# Patient Record
Sex: FEMALE | Race: WHITE | NOT HISPANIC OR LATINO | Employment: OTHER | URBAN - METROPOLITAN AREA
[De-identification: names, ages, dates, MRNs, and addresses within clinical notes are randomized per-mention and may not be internally consistent; named-entity substitution may affect disease eponyms.]

---

## 2023-04-07 ENCOUNTER — TELEPHONE (OUTPATIENT)
Dept: PRIMARY CARE | Facility: CLINIC | Age: 88
End: 2023-04-07
Payer: MEDICARE

## 2023-04-07 DIAGNOSIS — Z00.00 ROUTINE HEALTH MAINTENANCE: ICD-10-CM

## 2023-04-21 ENCOUNTER — LAB (OUTPATIENT)
Dept: LAB | Facility: LAB | Age: 88
End: 2023-04-21
Payer: MEDICARE

## 2023-04-21 DIAGNOSIS — Z00.00 ROUTINE HEALTH MAINTENANCE: ICD-10-CM

## 2023-04-21 LAB
ALANINE AMINOTRANSFERASE (SGPT) (U/L) IN SER/PLAS: 8 U/L (ref 7–45)
ALBUMIN (G/DL) IN SER/PLAS: 3.9 G/DL (ref 3.4–5)
ALKALINE PHOSPHATASE (U/L) IN SER/PLAS: 62 U/L (ref 33–136)
ANION GAP IN SER/PLAS: 10 MMOL/L (ref 10–20)
ASPARTATE AMINOTRANSFERASE (SGOT) (U/L) IN SER/PLAS: 11 U/L (ref 9–39)
BACTERIA, URINE: ABNORMAL /HPF
BASOPHILS (10*3/UL) IN BLOOD BY AUTOMATED COUNT: 0.02 X10E9/L (ref 0–0.1)
BASOPHILS/100 LEUKOCYTES IN BLOOD BY AUTOMATED COUNT: 0.4 % (ref 0–2)
BILIRUBIN TOTAL (MG/DL) IN SER/PLAS: 1.3 MG/DL (ref 0–1.2)
C REACTIVE PROTEIN (MG/L) IN SER/PLAS: <0.1 MG/DL
CALCIDIOL (25 OH VITAMIN D3) (NG/ML) IN SER/PLAS: 70 NG/ML
CALCIUM (MG/DL) IN SER/PLAS: 11.1 MG/DL (ref 8.6–10.6)
CARBON DIOXIDE, TOTAL (MMOL/L) IN SER/PLAS: 35 MMOL/L (ref 21–32)
CHLORIDE (MMOL/L) IN SER/PLAS: 101 MMOL/L (ref 98–107)
CHOLESTEROL (MG/DL) IN SER/PLAS: 176 MG/DL (ref 0–199)
CHOLESTEROL IN HDL (MG/DL) IN SER/PLAS: 45.1 MG/DL
CHOLESTEROL/HDL RATIO: 3.9
CREATININE (MG/DL) IN SER/PLAS: 0.97 MG/DL (ref 0.5–1.05)
DIGOXIN (NG/ML) IN SER/PLAS: 1.28 NG/ML (ref 0.8–2)
EOSINOPHILS (10*3/UL) IN BLOOD BY AUTOMATED COUNT: 0.04 X10E9/L (ref 0–0.4)
EOSINOPHILS/100 LEUKOCYTES IN BLOOD BY AUTOMATED COUNT: 0.7 % (ref 0–6)
ERYTHROCYTE DISTRIBUTION WIDTH (RATIO) BY AUTOMATED COUNT: 12.6 % (ref 11.5–14.5)
ERYTHROCYTE MEAN CORPUSCULAR HEMOGLOBIN CONCENTRATION (G/DL) BY AUTOMATED: 31.6 G/DL (ref 32–36)
ERYTHROCYTE MEAN CORPUSCULAR VOLUME (FL) BY AUTOMATED COUNT: 104 FL (ref 80–100)
ERYTHROCYTES (10*6/UL) IN BLOOD BY AUTOMATED COUNT: 3.65 X10E12/L (ref 4–5.2)
ESTIMATED AVERAGE GLUCOSE FOR HBA1C: 128 MG/DL
GFR FEMALE: 57 ML/MIN/1.73M2
GLUCOSE (MG/DL) IN SER/PLAS: 112 MG/DL (ref 74–99)
HEMATOCRIT (%) IN BLOOD BY AUTOMATED COUNT: 38 % (ref 36–46)
HEMOGLOBIN (G/DL) IN BLOOD: 12 G/DL (ref 12–16)
HEMOGLOBIN A1C/HEMOGLOBIN TOTAL IN BLOOD: 6.1 %
IMMATURE GRANULOCYTES/100 LEUKOCYTES IN BLOOD BY AUTOMATED COUNT: 0.4 % (ref 0–0.9)
LDL: 92 MG/DL (ref 0–99)
LEUKOCYTES (10*3/UL) IN BLOOD BY AUTOMATED COUNT: 5.5 X10E9/L (ref 4.4–11.3)
LYMPHOCYTES (10*3/UL) IN BLOOD BY AUTOMATED COUNT: 0.6 X10E9/L (ref 0.8–3)
LYMPHOCYTES/100 LEUKOCYTES IN BLOOD BY AUTOMATED COUNT: 10.8 % (ref 13–44)
MONOCYTES (10*3/UL) IN BLOOD BY AUTOMATED COUNT: 0.57 X10E9/L (ref 0.05–0.8)
MONOCYTES/100 LEUKOCYTES IN BLOOD BY AUTOMATED COUNT: 10.3 % (ref 2–10)
MUCUS, URINE: ABNORMAL /LPF
NEUTROPHILS (10*3/UL) IN BLOOD BY AUTOMATED COUNT: 4.28 X10E9/L (ref 1.6–5.5)
NEUTROPHILS/100 LEUKOCYTES IN BLOOD BY AUTOMATED COUNT: 77.4 % (ref 40–80)
NRBC (PER 100 WBCS) BY AUTOMATED COUNT: 0 /100 WBC (ref 0–0)
PLATELETS (10*3/UL) IN BLOOD AUTOMATED COUNT: 179 X10E9/L (ref 150–450)
POTASSIUM (MMOL/L) IN SER/PLAS: 4.8 MMOL/L (ref 3.5–5.3)
PROTEIN TOTAL: 5.9 G/DL (ref 6.4–8.2)
RBC, URINE: 1 /HPF (ref 0–5)
SODIUM (MMOL/L) IN SER/PLAS: 141 MMOL/L (ref 136–145)
SQUAMOUS EPITHELIAL CELLS, URINE: 1 /HPF
THYROTROPIN (MIU/L) IN SER/PLAS BY DETECTION LIMIT <= 0.05 MIU/L: 0.68 MIU/L (ref 0.44–3.98)
TRIGLYCERIDE (MG/DL) IN SER/PLAS: 195 MG/DL (ref 0–149)
UREA NITROGEN (MG/DL) IN SER/PLAS: 31 MG/DL (ref 6–23)
VLDL: 39 MG/DL (ref 0–40)
WBC CLUMPS, URINE: ABNORMAL /HPF
WBC, URINE: >182 /HPF (ref 0–5)

## 2023-04-21 PROCEDURE — 84443 ASSAY THYROID STIM HORMONE: CPT

## 2023-04-21 PROCEDURE — 80053 COMPREHEN METABOLIC PANEL: CPT

## 2023-04-21 PROCEDURE — 36415 COLL VENOUS BLD VENIPUNCTURE: CPT

## 2023-04-21 PROCEDURE — 86140 C-REACTIVE PROTEIN: CPT

## 2023-04-21 PROCEDURE — 80061 LIPID PANEL: CPT

## 2023-04-21 PROCEDURE — 82306 VITAMIN D 25 HYDROXY: CPT

## 2023-04-21 PROCEDURE — 85025 COMPLETE CBC W/AUTO DIFF WBC: CPT

## 2023-04-21 PROCEDURE — 81001 URINALYSIS AUTO W/SCOPE: CPT

## 2023-04-21 PROCEDURE — 83036 HEMOGLOBIN GLYCOSYLATED A1C: CPT

## 2023-04-25 PROBLEM — I50.30 HEART FAILURE WITH PRESERVED LEFT VENTRICULAR FUNCTION (HFPEF) (MULTI): Status: ACTIVE | Noted: 2023-04-25

## 2023-04-25 PROBLEM — J44.9 CHRONIC OBSTRUCTIVE PULMONARY DISEASE (MULTI): Status: ACTIVE | Noted: 2022-10-05

## 2023-04-25 PROBLEM — M48.062 NEUROGENIC CLAUDICATION DUE TO LUMBAR SPINAL STENOSIS: Status: ACTIVE | Noted: 2023-04-25

## 2023-04-25 PROBLEM — R53.83 FATIGUE: Status: ACTIVE | Noted: 2023-04-25

## 2023-04-25 PROBLEM — F32.A DEPRESSION: Status: ACTIVE | Noted: 2018-06-12

## 2023-04-25 PROBLEM — Z86.79 H/O: RHEUMATIC FEVER: Status: ACTIVE | Noted: 2023-04-25

## 2023-04-25 PROBLEM — K58.0 IRRITABLE BOWEL SYNDROME WITH DIARRHEA: Status: ACTIVE | Noted: 2022-10-05

## 2023-04-25 PROBLEM — Z95.818 PRESENCE OF WATCHMAN LEFT ATRIAL APPENDAGE CLOSURE DEVICE: Status: ACTIVE | Noted: 2023-04-25

## 2023-04-25 PROBLEM — R35.0 URINARY FREQUENCY: Status: ACTIVE | Noted: 2018-12-25

## 2023-04-25 PROBLEM — M54.16 LUMBAR RADICULAR PAIN: Status: ACTIVE | Noted: 2023-04-25

## 2023-04-25 PROBLEM — M54.16 SPINAL STENOSIS OF LUMBAR REGION WITH RADICULOPATHY: Status: ACTIVE | Noted: 2017-04-11

## 2023-04-25 PROBLEM — R60.9 EDEMA: Status: ACTIVE | Noted: 2023-04-25

## 2023-04-25 PROBLEM — G25.81 RESTLESS LEGS SYNDROME: Status: ACTIVE | Noted: 2023-04-25

## 2023-04-25 PROBLEM — G62.9 PERIPHERAL NEUROPATHY: Status: ACTIVE | Noted: 2023-04-25

## 2023-04-25 PROBLEM — G20.A1 PARKINSON'S DISEASE (MULTI): Status: ACTIVE | Noted: 2018-12-25

## 2023-04-25 PROBLEM — M17.0 DEGENERATIVE ARTHRITIS OF KNEE, BILATERAL: Status: ACTIVE | Noted: 2023-04-25

## 2023-04-25 PROBLEM — E78.5 HLD (HYPERLIPIDEMIA): Status: ACTIVE | Noted: 2022-10-05

## 2023-04-25 PROBLEM — M54.50 LOW BACK PAIN: Status: ACTIVE | Noted: 2023-04-25

## 2023-04-25 PROBLEM — K21.9 GASTROESOPHAGEAL REFLUX DISEASE: Status: ACTIVE | Noted: 2023-04-25

## 2023-04-25 PROBLEM — N18.30 CKD (CHRONIC KIDNEY DISEASE), STAGE III (MULTI): Status: ACTIVE | Noted: 2023-04-25

## 2023-04-25 PROBLEM — G31.84 MCI (MILD COGNITIVE IMPAIRMENT): Status: ACTIVE | Noted: 2023-04-25

## 2023-04-25 PROBLEM — K63.8219 SMALL INTESTINAL BACTERIAL OVERGROWTH: Status: ACTIVE | Noted: 2023-04-25

## 2023-04-25 PROBLEM — K52.9 CHRONIC DIARRHEA: Status: ACTIVE | Noted: 2023-04-25

## 2023-04-25 PROBLEM — G47.00 INSOMNIA: Status: ACTIVE | Noted: 2018-06-12

## 2023-04-25 PROBLEM — F41.9 ANXIETY: Status: ACTIVE | Noted: 2018-06-12

## 2023-04-25 PROBLEM — R39.15 URINARY URGENCY: Status: ACTIVE | Noted: 2023-04-25

## 2023-04-25 PROBLEM — R73.01 FASTING HYPERGLYCEMIA: Status: ACTIVE | Noted: 2023-04-25

## 2023-04-25 PROBLEM — R26.81 GAIT INSTABILITY: Status: ACTIVE | Noted: 2021-07-14

## 2023-04-25 PROBLEM — R26.9 ABNORMAL GAIT: Status: ACTIVE | Noted: 2021-07-14

## 2023-04-25 PROBLEM — D64.9 ANEMIA: Status: ACTIVE | Noted: 2023-04-25

## 2023-04-25 PROBLEM — M48.061 SPINAL STENOSIS OF LUMBAR REGION WITH RADICULOPATHY: Status: ACTIVE | Noted: 2017-04-11

## 2023-04-25 PROBLEM — N95.2 VAGINAL ATROPHY: Status: ACTIVE | Noted: 2023-04-25

## 2023-04-25 PROBLEM — R26.89 IMPAIRMENT OF BALANCE: Status: ACTIVE | Noted: 2017-08-03

## 2023-04-25 PROBLEM — L71.9 ROSACEA: Status: ACTIVE | Noted: 2023-04-25

## 2023-04-25 PROBLEM — R06.00 DYSPNEA: Status: ACTIVE | Noted: 2023-04-25

## 2023-04-25 NOTE — PROGRESS NOTES
Physical Exam    Name Adelina Case    Date of Service :4/26/2023      Adelina Case  87 y.o. is here for an annual physical exam  Past medical history significant for  Atrial fibrillation which has been persistent he did have a Watchman procedure performed in fall 2022 and now off anticoagulation which is good news as she has had multiple falls and had had bleeds including GI bleed she follows routinely with cardiology  Dr Kimball   Chronic kidney disease which has been stable  Sleep apnea  COPD uses nocturnal oxygen  Parkinson's disease follows routinely with neurology through [facility] Dr. Klein  Chronic peripheral edema she uses support stockings routinely  With diastolic heart failure  Chronic kidney disease kidney functions have been stable  Hypertension  Diarrhea felt to be more collagenous colitis versus small bowel overgrowth she did feel better after she took the Xifaxan  Anxiety and depression  Low back pain spinal stenosis she does see Dr. Cruz from pain management pretty routinely  She is status post partial colectomy for diverticular disease she has she did have a temporary colostomy at one time.  She has had recurrent bowel obstruction as well none for a while  Unfortunately does have frequent falls last was probably within the month    Overall feels she is doing okay  She continues to live independently with the help of her daughter is considering moving to long term community    Her family history is really not significant  There is no coronary artery disease at young ages there is no breast ovarian or colon cancer in first-degree relatives  Her daughter did die of glioblastoma        Past Medical History:   Diagnosis Date    Acute sinusitis, unspecified 08/13/2013    Acute sinusitis    Adjustment disorder with depressed mood 05/01/2017    Grieving    Candidal esophagitis (CMS/[facility]) 12/09/2020    Candida esophagitis    Familial hypercholesterolemia 12/09/2020    Familial  hypercholesteremia    Lymphocytic colitis 2018    Lymphocytic colitis    Pain in unspecified joint     Joint pain    Personal history of other diseases of the circulatory system 2018    History of sinus bradycardia    Personal history of other diseases of the digestive system 2018    History of gastroesophageal reflux (GERD)    Personal history of other diseases of the digestive system     History of esophageal reflux    Radiculopathy, cervical region     Cervical radiculopathy    Radiculopathy, lumbosacral region     Lumbosacral radiculopathy at L5    Rheumatic diseases of endocardium, valve unspecified 2018    Rheumatic disease of heart valve       Past Surgical History:   Procedure Laterality Date    MR HEAD ANGIO WO IV CONTRAST  2021    MR HEAD ANGIO WO IV CONTRAST 2021 [facility]    MR NECK ANGIO WO IV CONTRAST  2021    MR NECK ANGIO WO IV CONTRAST 2021 [facility]    OTHER SURGICAL HISTORY  06/10/2013    Endoscopic Control Of Gastric Bleeding    OTHER SURGICAL HISTORY  2019    Hysterectomy    OTHER SURGICAL HISTORY  2019    Ankle surgery    OTHER SURGICAL HISTORY  2019    Cornea transplantation    OTHER SURGICAL HISTORY  2019    Colostomy    OTHER SURGICAL HISTORY  2019    Knee replacement        Social History     Tobacco Use    Smoking status: Former     Years: 40.00     Types: Cigarettes    Smokeless tobacco: Never   Vaping Use    Vaping status: Never Used        Social History     Social History Narrative    Is originally from [place] has lived in [place] for some time    He is  since the mid 80s    2 daughters 1 biologic who unfortunately  of glioblastoma and Barbara who is adopted    She has 2 grandsons    She did work as a teacher    Her diet is pretty healthy    She is very socially engaged in her Caodaism has friends tries to play bridge    Was a previous smoker but not for many years    She does drink  "alcohol and usually 1 alcoholic beverage daily       No family history on file.     /60 (Patient Position: Sitting)   Ht 1.651 m (5' 5\")   Wt 71.7 kg (158 lb)   BMI 26.29 kg/m²     Physical Exam  Physical examination  Reveals a well-developed woman in no acute distress    appearance is age-appropriate  HEENT exam  Extraocular motion is intact  Tympanic membranes and external auditory canals are normal  Oropharynx is normal  There is no cervical lymphadenopathy appreciated  The thyroid is within normal limits    Lungs    clear to auscultation and percussion    Cardiovascular   Irregular rate and rhythm   Systolic murmur present  No murmurs rubs or gallops are appreciated    Breast examination   No dominant masses nipple discharge or axillary lymphadenopathy is appreciated    Abdomen   soft nontender bowel sounds are positive   there is no organomegaly noted        Periphery  Pulses are present without deficits noted  1-2+ peripheral edema is noted    Musculoskeletal  Gait is antalgic and she has great difficulty getting started and getting up from a seated position she walks with walker  Is no joint erythema or swelling noted  Range of motion is within normal limits  Strength is 5 of 5 without deficits noted    Dermatology  No concerning skin lesions are noted    Neurology  No deficits are noted  Judgment appears appropriate  Mood and affect are appropriate    MoCA was performed and scored 26 of 30 she missed serial sevens as well as only recalled 2 items after about 5 minutes             Results from last 7 days   Lab Units 04/21/23  0908   WBC AUTO x10E9/L 5.5   HEMOGLOBIN g/dL 12.0   HEMATOCRIT % 38.0   PLATELETS AUTO x10E9/L 179   NEUTROS PCT AUTO % 77.4   LYMPHS PCT AUTO % 10.8   MONOS PCT AUTO % 10.3   EOS PCT AUTO % 0.7      Results from last 7 days   Lab Units 04/21/23  0908   HEMOGLOBIN A1C % 6.1*     Results from last 7 days   Lab Units 04/21/23  0908   SODIUM mmol/L 141   POTASSIUM mmol/L 4.8 "   CHLORIDE mmol/L 101   CO2 mmol/L 35*   BUN mg/dL 31*   CREATININE mg/dL 0.97   CALCIUM mg/dL 11.1*   PROTEIN TOTAL g/dL 5.9*   BILIRUBIN TOTAL mg/dL 1.3*   ALK PHOS U/L 62   ALT U/L 8   AST U/L 11   GLUCOSE mg/dL 112*      Results from last 7 days   Lab Units 04/21/23  0908   CHOLESTEROL mg/dL 176   TRIGLYCERIDES mg/dL 195*   HDL mg/dL 45.1   LDLF mg/dL 92      Results from last 7 days   Lab Units 04/21/23  0908   CRP mg/dL <0.10      Results from last 7 days   Lab Units 04/21/23  0908   TSH mIU/L 0.68           No lab exists for component: UAPPEAR, UCOLOR  No components found for: UA  Lab on 04/21/2023   Component Date Value Ref Range Status    WBC 04/21/2023 5.5  4.4 - 11.3 x10E9/L Final    nRBC 04/21/2023 0.0  0.0 - 0.0 /100 WBC Final    RBC 04/21/2023 3.65 (L)  4.00 - 5.20 x10E12/L Final    Hemoglobin 04/21/2023 12.0  12.0 - 16.0 g/dL Final    Hematocrit 04/21/2023 38.0  36.0 - 46.0 % Final    MCV 04/21/2023 104 (H)  80 - 100 fL Final    MCHC 04/21/2023 31.6 (L)  32.0 - 36.0 g/dL Final    Platelets 04/21/2023 179  150 - 450 x10E9/L Final    RDW 04/21/2023 12.6  11.5 - 14.5 % Final    Neutrophils % 04/21/2023 77.4  40.0 - 80.0 % Final    Immature Granulocytes %, Automated 04/21/2023 0.4  0.0 - 0.9 % Final     Immature Granulocyte Count (IG) includes promyelocytes,    myelocytes and metamyelocytes but does not include bands.   Percent differential counts (%) should be interpreted in the   context of the absolute cell counts (cells/L).    Lymphocytes % 04/21/2023 10.8  13.0 - 44.0 % Final    Monocytes % 04/21/2023 10.3  2.0 - 10.0 % Final    Eosinophils % 04/21/2023 0.7  0.0 - 6.0 % Final    Basophils % 04/21/2023 0.4  0.0 - 2.0 % Final    Neutrophils Absolute 04/21/2023 4.28  1.60 - 5.50 x10E9/L Final    Lymphocytes Absolute 04/21/2023 0.60 (L)  0.80 - 3.00 x10E9/L Final    Monocytes Absolute 04/21/2023 0.57  0.05 - 0.80 x10E9/L Final    Eosinophils Absolute 04/21/2023 0.04  0.00 - 0.40 x10E9/L Final     Basophils Absolute 04/21/2023 0.02  0.00 - 0.10 x10E9/L Final    Glucose 04/21/2023 112 (H)  74 - 99 mg/dL Final    Sodium 04/21/2023 141  136 - 145 mmol/L Final    Potassium 04/21/2023 4.8  3.5 - 5.3 mmol/L Final    Chloride 04/21/2023 101  98 - 107 mmol/L Final    Bicarbonate 04/21/2023 35 (H)  21 - 32 mmol/L Final    Anion Gap 04/21/2023 10  10 - 20 mmol/L Final    Urea Nitrogen 04/21/2023 31 (H)  6 - 23 mg/dL Final    Creatinine 04/21/2023 0.97  0.50 - 1.05 mg/dL Final    GFR Female 04/21/2023 57 (A)  >90 mL/min/1.73m2 Final     CALCULATIONS OF ESTIMATED GFR ARE PERFORMED   USING THE 2021 CKD-EPI STUDY REFIT EQUATION   WITHOUT THE RACE VARIABLE FOR THE IDMS-TRACEABLE   CREATININE METHODS.    https://jasn.asnjournals.org/content/early/2021/09/22/ASN.5456373624    Calcium 04/21/2023 11.1 (H)  8.6 - 10.6 mg/dL Final    Albumin 04/21/2023 3.9  3.4 - 5.0 g/dL Final    Alkaline Phosphatase 04/21/2023 62  33 - 136 U/L Final    Total Protein 04/21/2023 5.9 (L)  6.4 - 8.2 g/dL Final    AST 04/21/2023 11  9 - 39 U/L Final    Total Bilirubin 04/21/2023 1.3 (H)  0.0 - 1.2 mg/dL Final    ALT (SGPT) 04/21/2023 8  7 - 45 U/L Final     Patients treated with Sulfasalazine may generate    falsely decreased results for ALT.    CRP 04/21/2023 <0.10  mg/dL Final    REF VALUE  < 1.00    Hemoglobin A1C 04/21/2023 6.1 (A)  % Final         Diagnosis of Diabetes-Adults   Non-Diabetic: < or = 5.6%   Increased risk for developing diabetes: 5.7-6.4%   Diagnostic of diabetes: > or = 6.5%  .       Monitoring of Diabetes                Age (y)     Therapeutic Goal (%)   Adults:          >18           <7.0   Pediatrics:    13-18           <7.5                   7-12           <8.0                   0- 6            7.5-8.5   American Diabetes Association. Diabetes Care 33(S1), Jan 2010.    Estimated Average Glucose 04/21/2023 128  MG/DL Final    Cholesterol 04/21/2023 176  0 - 199 mg/dL Final    .      AGE      DESIRABLE   BORDERLINE HIGH    HIGH     0-19 Y     0 - 169       170 - 199     >/= 200    20-24 Y     0 - 189       190 - 224     >/= 225         >24 Y     0 - 199       200 - 239     >/= 240   **All ranges are based on fasting samples. Specific   therapeutic targets will vary based on patient-specific   cardiac risk.  .   Pediatric guidelines reference:Pediatrics 2011, 128(S5).   Adult guidelines reference: NCEP ATPIII Guidelines,     HILDA 2001, 258:8376-97  .   Venipuncture immediately after or during the    administration of Metamizole may lead to falsely   low results. Testing should be performed immediately   prior to Metamizole dosing.    HDL 04/21/2023 45.1  mg/dL Final    .      AGE      VERY LOW   LOW     NORMAL    HIGH       0-19 Y       < 35   < 40     40-45     ----    20-24 Y       ----   < 40       >45     ----      >24 Y       ----   < 40     40-60      >60  .    Cholesterol/HDL Ratio 04/21/2023 3.9   Final    REF VALUES  DESIRABLE  < 3.4  HIGH RISK  > 5.0    LDL 04/21/2023 92  0 - 99 mg/dL Final    .                           NEAR      BORD      AGE      DESIRABLE  OPTIMAL    HIGH     HIGH     VERY HIGH     0-19 Y     0 - 109     ---    110-129   >/= 130     ----    20-24 Y     0 - 119     ---    120-159   >/= 160     ----      >24 Y     0 -  99   100-129  130-159   160-189     >/=190  .    VLDL 04/21/2023 39  0 - 40 mg/dL Final    Triglycerides 04/21/2023 195 (H)  0 - 149 mg/dL Final    .      AGE      DESIRABLE   BORDERLINE HIGH   HIGH     VERY HIGH   0 D-90 D    19 - 174         ----         ----        ----  91 D- 9 Y     0 -  74        75 -  99     >/= 100      ----    10-19 Y     0 -  89        90 - 129     >/= 130      ----    20-24 Y     0 - 114       115 - 149     >/= 150      ----         >24 Y     0 - 149       150 - 199    200- 499    >/= 500  .   Venipuncture immediately after or during the    administration of Metamizole may lead to falsely   low results. Testing should be performed immediately   prior to Metamizole  dosing.    TSH 04/21/2023 0.68  0.44 - 3.98 mIU/L Final     TSH testing is performed using different testing    methodology at [facility] than at other    [facility] hospitals. Direct result comparisons should    only be made within the same method.    WBC, Urine 04/21/2023 >182 (A)  0 - 5 /HPF Final    WBC Clumps, Urine 04/21/2023 MANY  /HPF Final    RBC, Urine 04/21/2023 1  0 - 5 /HPF Final    Squamous Epithelial Cells, Urine 04/21/2023 1  /HPF Final    Bacteria, Urine 04/21/2023 3+ (A)  /HPF Final    Mucus, Urine 04/21/2023 1+  /LPF Final    Vitamin D, 25-Hydroxy 04/21/2023 70  ng/mL Final    .  DEFICIENCY:         < 20   NG/ML  INSUFFICIENCY:      20-29  NG/ML  SUFFICIENCY:         NG/ML    THIS ASSAY ACCURATELY QUANTIFIES THE SUM OF  VITAMIN D3, 25-HYDROXY AND VIT D2,25-HYDROXY.   Orders Only on 04/21/2023   Component Date Value Ref Range Status    Digoxin Lvl 04/21/2023 1.28  0.80 - 2.00 ng/mL Final    Comment:  Digoxin measurements can be falsely increased or    decreased if patient is receiving Digoxin-binding   antibody therapy or if Digoxin-like immunoreactive   factors are present.               Problem List Items Addressed This Visit          Nervous    Insomnia    MCI (mild cognitive impairment)    Parkinson's disease (CMS/[facility])    Peripheral neuropathy    Spinal stenosis of lumbar region with radiculopathy       Circulatory    Presence of Watchman left atrial appendage closure device       Genitourinary    CKD (chronic kidney disease), stage III (CMS/HCC)       Other    Anxiety - Primary    Relevant Medications    DULoxetine (Cymbalta) 20 mg DR capsule     Other Visit Diagnoses       Asymptomatic menopausal state        Relevant Orders    XR DEXA bone density            Assessment/Plan   #1 atrial fibrillation she now is status post Watchman procedure and really seems to be doing well she continues to follow routinely with cardiology  2.  Parkinson's disease this really is her most  limiting issue she continues to follow routinely with neurology Dr. Klein at [facility]  3.  Anxiety she has had a longstanding history of anxiety she had been on more lorazepam in the past she now is taking BuSpar she is on Cymbalta or duloxetine currently 30 mg daily discussed with patient and daughter Barbara we will increase to 40 mg she did take 20 mg twice daily for both the 20 mg tablets 40 mg all at once  4.  Neuropathy is possible the duloxetine could help with the neuropathy as well  5.  Radiculopathy and lumbar spine stenosis she sees Dr. Cruz  6.  Mild cognitive impairment really did pretty well on her MoCA we will continue to follow  7.  For edema continue support stockings difficult for her to get on  8.  Diarrhea which is pretty much resolved since the Xifaxan been the question of [facility] is colitis versus SIBO anything may be a little bit more constipated I have recommended she continue the Citrucel or Metamucil routinely she has had a history of bowel obstruction in the past 2  9. hyper glycemia with slightly elevated hemoglobin A1c prediabetes recommended watching carbohydrate intake  #10 health maintenance  Reviewed all blood work which really is pretty acceptable she is no longer anemic  Up-to-date with immunizations had Prevnar 13 in 2015 and Pneumovax in 2019  Not sure when last bone densitometry was performed and with her risk of falls and multiple falls I want to make sure she is up to date  #11 hypercholesteremia has done well just with low-dose simvastatin  12.  Hypertension good control  13.  Rosacea stable on metronidazole  14.  Health maintenance  She is up-to-date with immunizations including pneumonia vaccinations and Shingrix vaccination

## 2023-04-26 ENCOUNTER — OFFICE VISIT (OUTPATIENT)
Dept: PRIMARY CARE | Facility: CLINIC | Age: 88
End: 2023-04-26

## 2023-04-26 VITALS
SYSTOLIC BLOOD PRESSURE: 118 MMHG | BODY MASS INDEX: 26.33 KG/M2 | HEIGHT: 65 IN | DIASTOLIC BLOOD PRESSURE: 60 MMHG | WEIGHT: 158 LBS

## 2023-04-26 DIAGNOSIS — M48.061 SPINAL STENOSIS OF LUMBAR REGION WITH RADICULOPATHY: ICD-10-CM

## 2023-04-26 DIAGNOSIS — N18.31 STAGE 3A CHRONIC KIDNEY DISEASE (MULTI): ICD-10-CM

## 2023-04-26 DIAGNOSIS — Z78.0 ASYMPTOMATIC MENOPAUSAL STATE: ICD-10-CM

## 2023-04-26 DIAGNOSIS — M54.16 SPINAL STENOSIS OF LUMBAR REGION WITH RADICULOPATHY: ICD-10-CM

## 2023-04-26 DIAGNOSIS — F51.01 PRIMARY INSOMNIA: ICD-10-CM

## 2023-04-26 DIAGNOSIS — F41.9 ANXIETY: Primary | ICD-10-CM

## 2023-04-26 DIAGNOSIS — Z95.818 PRESENCE OF WATCHMAN LEFT ATRIAL APPENDAGE CLOSURE DEVICE: ICD-10-CM

## 2023-04-26 DIAGNOSIS — G31.84 MCI (MILD COGNITIVE IMPAIRMENT): ICD-10-CM

## 2023-04-26 DIAGNOSIS — G62.89 OTHER POLYNEUROPATHY: ICD-10-CM

## 2023-04-26 DIAGNOSIS — G20.A1 PARKINSON'S DISEASE (MULTI): ICD-10-CM

## 2023-04-26 PROCEDURE — 1160F RVW MEDS BY RX/DR IN RCRD: CPT | Performed by: INTERNAL MEDICINE

## 2023-04-26 PROCEDURE — 1159F MED LIST DOCD IN RCRD: CPT | Performed by: INTERNAL MEDICINE

## 2023-04-26 PROCEDURE — 1157F ADVNC CARE PLAN IN RCRD: CPT | Performed by: INTERNAL MEDICINE

## 2023-04-26 PROCEDURE — 3078F DIAST BP <80 MM HG: CPT | Performed by: INTERNAL MEDICINE

## 2023-04-26 PROCEDURE — 1036F TOBACCO NON-USER: CPT | Performed by: INTERNAL MEDICINE

## 2023-04-26 PROCEDURE — UHSPHYS PR UH SELECT PHYSICAL: Performed by: INTERNAL MEDICINE

## 2023-04-26 PROCEDURE — 3074F SYST BP LT 130 MM HG: CPT | Performed by: INTERNAL MEDICINE

## 2023-04-26 RX ORDER — LOSARTAN POTASSIUM 50 MG/1
50 TABLET ORAL DAILY
COMMUNITY
End: 2023-11-29 | Stop reason: SDUPTHER

## 2023-04-26 RX ORDER — UMECLIDINIUM 62.5 UG/1
1 AEROSOL, POWDER ORAL DAILY
COMMUNITY
End: 2024-03-05

## 2023-04-26 RX ORDER — MELATONIN 5 MG
1 CAPSULE ORAL NIGHTLY
COMMUNITY
Start: 2021-04-29

## 2023-04-26 RX ORDER — METOPROLOL SUCCINATE 25 MG/1
12.5 TABLET, EXTENDED RELEASE ORAL DAILY
COMMUNITY

## 2023-04-26 RX ORDER — QUINIDINE GLUCONATE 324 MG
1 TABLET, EXTENDED RELEASE ORAL DAILY
COMMUNITY

## 2023-04-26 RX ORDER — DULOXETIN HYDROCHLORIDE 30 MG/1
30 CAPSULE, DELAYED RELEASE ORAL DAILY
COMMUNITY
End: 2023-04-26 | Stop reason: ALTCHOICE

## 2023-04-26 RX ORDER — BUDESONIDE 3 MG/1
9 CAPSULE, COATED PELLETS ORAL
COMMUNITY
Start: 2015-03-10 | End: 2024-03-25 | Stop reason: SDUPTHER

## 2023-04-26 RX ORDER — CARBIDOPA AND LEVODOPA 25; 100 MG/1; MG/1
1 TABLET, EXTENDED RELEASE ORAL NIGHTLY
COMMUNITY
Start: 2021-12-06 | End: 2023-10-10 | Stop reason: DRUGHIGH

## 2023-04-26 RX ORDER — FUROSEMIDE 40 MG/1
TABLET ORAL
COMMUNITY
Start: 2013-08-13 | End: 2024-01-12

## 2023-04-26 RX ORDER — METRONIDAZOLE 7.5 MG/G
GEL TOPICAL
COMMUNITY
Start: 2021-11-30

## 2023-04-26 RX ORDER — MIRABEGRON 25 MG/1
1 TABLET, FILM COATED, EXTENDED RELEASE ORAL DAILY
COMMUNITY
End: 2024-01-29 | Stop reason: ALTCHOICE

## 2023-04-26 RX ORDER — FAMOTIDINE 20 MG/1
1 TABLET, FILM COATED ORAL DAILY
COMMUNITY

## 2023-04-26 RX ORDER — DULOXETIN HYDROCHLORIDE 20 MG/1
20 CAPSULE, DELAYED RELEASE ORAL 2 TIMES DAILY
Qty: 60 CAPSULE | Refills: 5 | Status: SHIPPED | OUTPATIENT
Start: 2023-04-26 | End: 2023-10-04 | Stop reason: ALTCHOICE

## 2023-04-26 RX ORDER — LANOLIN ALCOHOL/MO/W.PET/CERES
1000 CREAM (GRAM) TOPICAL
COMMUNITY
Start: 2011-09-13

## 2023-04-26 RX ORDER — SIMVASTATIN 20 MG/1
20 TABLET, FILM COATED ORAL DAILY
COMMUNITY
End: 2023-12-11 | Stop reason: SDUPTHER

## 2023-04-26 RX ORDER — BUSPIRONE HYDROCHLORIDE 15 MG/1
15 TABLET ORAL 2 TIMES DAILY
COMMUNITY
Start: 2017-07-31 | End: 2024-04-10

## 2023-04-26 RX ORDER — LORAZEPAM 0.5 MG/1
0.5 TABLET ORAL
COMMUNITY
End: 2023-05-16 | Stop reason: SDUPTHER

## 2023-04-26 RX ORDER — ELECTROLYTES/DEXTROSE
SOLUTION, ORAL ORAL
COMMUNITY

## 2023-04-26 RX ORDER — DEXAMETHASONE SODIUM PHOSPHATE 1 MG/ML
SOLUTION/ DROPS OPHTHALMIC
COMMUNITY

## 2023-04-26 RX ORDER — DIGOXIN 125 MCG
125 TABLET ORAL DAILY
COMMUNITY
End: 2023-10-12 | Stop reason: SDUPTHER

## 2023-04-26 RX ORDER — PANTOPRAZOLE SODIUM 40 MG/1
80 TABLET, DELAYED RELEASE ORAL
COMMUNITY
Start: 2015-07-30 | End: 2023-10-05 | Stop reason: SDUPTHER

## 2023-04-26 RX ORDER — TRAZODONE HYDROCHLORIDE 50 MG/1
1 TABLET ORAL NIGHTLY
COMMUNITY
Start: 2021-05-05 | End: 2024-02-05 | Stop reason: SDUPTHER

## 2023-04-26 RX ORDER — CARBIDOPA AND LEVODOPA 25; 100 MG/1; MG/1
TABLET ORAL
COMMUNITY
Start: 2022-05-13

## 2023-04-26 RX ORDER — TURMERIC ROOT EXTRACT 500 MG
TABLET ORAL
COMMUNITY
Start: 2021-07-27

## 2023-04-26 ASSESSMENT — PAIN SCALES - GENERAL: PAINLEVEL: 0-NO PAIN

## 2023-04-26 NOTE — PATIENT INSTRUCTIONS
It was good to see you.  You are doing a good job with your overall health care.   As we discussed I do think it is worth a trial of increasing your Cymbalta or duloxetine you are currently taking 30 mg we are going to go to 20 mg twice daily or you can take the entire 40 mg at the same time as well we can increase further as well.  Our hope is this may help with the anxiety as well as the neuropathy    Follow a brain healthy lifestyle, which includes  Controlling medical conditions such as diabetes, high blood pressure, high cholesterol, thyroid disease, sleep apnea , depression and anxiety  Use eyeglasses or hearing aids appropriately if indicated  Eat a heart healthy diet such as a Mediterranean /low-fat diet with abundant fresh fruits and vegetables as well as fish  Get adequate and routine regular exercise  Maintain good sleep hygiene avoiding daytime naps and trying to get 7 to 8 hours of uninterrupted sleep at nighttime  Stay mentally active which may include puzzles, word searches, reading, computer games, playing cards or board games  Stay socially active and engaged as well     Watch carbohydrate intake with slightly high sugar and hemoglobin A1c

## 2023-05-16 DIAGNOSIS — F41.9 ANXIETY: ICD-10-CM

## 2023-05-16 NOTE — TELEPHONE ENCOUNTER
Pts daughter is calling in regards to RX was sent to wrong pharmacy and needs to be sent the [facility] shopping sara YEUNG

## 2023-05-17 RX ORDER — LORAZEPAM 0.5 MG/1
0.5 TABLET ORAL DAILY PRN
Qty: 10 TABLET | Refills: 0 | Status: SHIPPED | OUTPATIENT
Start: 2023-05-17 | End: 2023-09-18 | Stop reason: SDUPTHER

## 2023-07-11 ENCOUNTER — HOSPITAL ENCOUNTER (OUTPATIENT)
Dept: DATA CONVERSION | Facility: HOSPITAL | Age: 88
End: 2023-07-11
Attending: ANESTHESIOLOGY | Admitting: ANESTHESIOLOGY
Payer: MEDICARE

## 2023-07-11 DIAGNOSIS — M54.16 RADICULOPATHY, LUMBAR REGION: ICD-10-CM

## 2023-07-11 DIAGNOSIS — I48.91 UNSPECIFIED ATRIAL FIBRILLATION (MULTI): ICD-10-CM

## 2023-07-11 DIAGNOSIS — I50.9 HEART FAILURE, UNSPECIFIED (MULTI): ICD-10-CM

## 2023-07-11 DIAGNOSIS — Z86.73 PERSONAL HISTORY OF TRANSIENT ISCHEMIC ATTACK (TIA), AND CEREBRAL INFARCTION WITHOUT RESIDUAL DEFICITS: ICD-10-CM

## 2023-07-11 DIAGNOSIS — M48.062 SPINAL STENOSIS, LUMBAR REGION WITH NEUROGENIC CLAUDICATION: ICD-10-CM

## 2023-08-24 ENCOUNTER — TELEPHONE (OUTPATIENT)
Dept: PRIMARY CARE | Facility: CLINIC | Age: 88
End: 2023-08-24
Payer: MEDICARE

## 2023-08-24 NOTE — TELEPHONE ENCOUNTER
She sometimes gags when she swallows and her Parkinson's doctor wants her to do some Speech Therapy. Is there a certain person you like or should she stick with [facility] where she is getting PT and OT. She said [facility] is a little out her way so was looking for someone is the [facility]? OK to LM

## 2023-09-07 PROBLEM — N31.9 NEUROGENIC BLADDER: Status: ACTIVE | Noted: 2023-09-07

## 2023-09-07 PROBLEM — M16.12 PRIMARY OSTEOARTHRITIS OF LEFT HIP: Status: ACTIVE | Noted: 2023-09-07

## 2023-09-07 PROBLEM — M20.61 TOE DEFORMITY, RIGHT: Status: ACTIVE | Noted: 2023-09-07

## 2023-09-07 PROBLEM — I35.0 MILD AORTIC STENOSIS: Status: ACTIVE | Noted: 2023-09-07

## 2023-09-07 PROBLEM — L65.9 NONSCARRING HAIR LOSS, UNSPECIFIED: Status: ACTIVE | Noted: 2019-06-05

## 2023-09-07 PROBLEM — R32 URINARY INCONTINENCE IN FEMALE: Status: ACTIVE | Noted: 2023-09-07

## 2023-09-07 RX ORDER — RIVASTIGMINE 4.6 MG/24H
PATCH, EXTENDED RELEASE TRANSDERMAL
COMMUNITY
Start: 2023-08-07

## 2023-09-07 RX ORDER — PANTOPRAZOLE SODIUM 20 MG/1
20 TABLET, DELAYED RELEASE ORAL EVERY MORNING
COMMUNITY
End: 2023-10-04 | Stop reason: ALTCHOICE

## 2023-09-07 RX ORDER — ACETAMINOPHEN 500 MG
TABLET ORAL
COMMUNITY

## 2023-09-07 RX ORDER — VIT C/E/ZN/COPPR/LUTEIN/ZEAXAN 250MG-90MG
1000 CAPSULE ORAL EVERY MORNING
COMMUNITY
End: 2023-10-04 | Stop reason: ALTCHOICE

## 2023-09-07 RX ORDER — HYDRALAZINE HYDROCHLORIDE 10 MG/1
TABLET, FILM COATED ORAL
COMMUNITY
End: 2023-10-04 | Stop reason: ALTCHOICE

## 2023-09-07 RX ORDER — FUROSEMIDE 40 MG/5ML
SOLUTION ORAL DAILY
COMMUNITY
End: 2023-10-04 | Stop reason: ALTCHOICE

## 2023-09-07 RX ORDER — GABAPENTIN 100 MG/1
CAPSULE ORAL
COMMUNITY
End: 2023-10-04 | Stop reason: ALTCHOICE

## 2023-09-07 RX ORDER — AMOXICILLIN 500 MG/1
CAPSULE ORAL
COMMUNITY
Start: 2023-06-23

## 2023-09-07 RX ORDER — DULOXETIN HYDROCHLORIDE 30 MG/1
30 CAPSULE, DELAYED RELEASE ORAL 2 TIMES DAILY
COMMUNITY
Start: 2023-08-10 | End: 2024-05-29 | Stop reason: SDUPTHER

## 2023-09-07 RX ORDER — SOTALOL HYDROCHLORIDE 80 MG/1
TABLET ORAL
COMMUNITY
End: 2023-10-04 | Stop reason: ALTCHOICE

## 2023-09-07 RX ORDER — BUDESONIDE AND FORMOTEROL FUMARATE DIHYDRATE 80; 4.5 UG/1; UG/1
AEROSOL RESPIRATORY (INHALATION)
COMMUNITY
End: 2023-10-04 | Stop reason: ALTCHOICE

## 2023-09-07 RX ORDER — LOSARTAN POTASSIUM 25 MG/1
TABLET ORAL
COMMUNITY
End: 2023-10-04 | Stop reason: ALTCHOICE

## 2023-09-18 DIAGNOSIS — F41.9 ANXIETY: ICD-10-CM

## 2023-09-18 RX ORDER — LORAZEPAM 0.5 MG/1
0.5 TABLET ORAL DAILY PRN
Qty: 10 TABLET | Refills: 0 | Status: SHIPPED | OUTPATIENT
Start: 2023-09-18 | End: 2023-10-18 | Stop reason: SDUPTHER

## 2023-09-30 ENCOUNTER — HOSPITAL ENCOUNTER (EMERGENCY)
Facility: HOSPITAL | Age: 88
Discharge: HOME | End: 2023-09-30
Attending: STUDENT IN AN ORGANIZED HEALTH CARE EDUCATION/TRAINING PROGRAM
Payer: MEDICARE

## 2023-09-30 VITALS
BODY MASS INDEX: 23.78 KG/M2 | OXYGEN SATURATION: 97 % | WEIGHT: 148 LBS | TEMPERATURE: 97.4 F | SYSTOLIC BLOOD PRESSURE: 159 MMHG | DIASTOLIC BLOOD PRESSURE: 79 MMHG | RESPIRATION RATE: 16 BRPM | HEART RATE: 65 BPM | HEIGHT: 66 IN

## 2023-09-30 DIAGNOSIS — E87.6 HYPOKALEMIA: ICD-10-CM

## 2023-09-30 DIAGNOSIS — K59.1 FUNCTIONAL DIARRHEA: Primary | ICD-10-CM

## 2023-09-30 DIAGNOSIS — N39.0 ACUTE UTI: ICD-10-CM

## 2023-09-30 LAB
ALBUMIN SERPL BCP-MCNC: 3.5 G/DL (ref 3.4–5)
ALP SERPL-CCNC: 62 U/L (ref 33–136)
ALT SERPL W P-5'-P-CCNC: 3 U/L (ref 7–45)
ANION GAP SERPL CALC-SCNC: 7 MMOL/L (ref 10–20)
APPEARANCE UR: ABNORMAL
AST SERPL W P-5'-P-CCNC: 15 U/L (ref 9–39)
BACTERIA #/AREA URNS AUTO: ABNORMAL /HPF
BASOPHILS # BLD AUTO: 0.02 X10*3/UL (ref 0–0.1)
BASOPHILS NFR BLD AUTO: 0.4 %
BILIRUB SERPL-MCNC: 1 MG/DL (ref 0–1.2)
BILIRUB UR STRIP.AUTO-MCNC: NEGATIVE MG/DL
BUN SERPL-MCNC: 26 MG/DL (ref 6–23)
CALCIUM SERPL-MCNC: 9.4 MG/DL (ref 8.6–10.3)
CHLORIDE SERPL-SCNC: 104 MMOL/L (ref 98–107)
CO2 SERPL-SCNC: 29 MMOL/L (ref 21–32)
COLOR UR: YELLOW
CREAT SERPL-MCNC: 1.01 MG/DL (ref 0.5–1.05)
EOSINOPHIL # BLD AUTO: 0.04 X10*3/UL (ref 0–0.4)
EOSINOPHIL NFR BLD AUTO: 0.7 %
ERYTHROCYTE [DISTWIDTH] IN BLOOD BY AUTOMATED COUNT: 13.5 % (ref 11.5–14.5)
GFR SERPL CREATININE-BSD FRML MDRD: 55 ML/MIN/1.73M*2
GLUCOSE SERPL-MCNC: 129 MG/DL (ref 74–99)
GLUCOSE UR STRIP.AUTO-MCNC: NEGATIVE MG/DL
HCT VFR BLD AUTO: 35.9 % (ref 36–46)
HGB BLD-MCNC: 11.7 G/DL (ref 12–16)
HYALINE CASTS #/AREA URNS AUTO: ABNORMAL /LPF
IMM GRANULOCYTES # BLD AUTO: 0.02 X10*3/UL (ref 0–0.5)
IMM GRANULOCYTES NFR BLD AUTO: 0.4 % (ref 0–0.9)
KETONES UR STRIP.AUTO-MCNC: NEGATIVE MG/DL
LEUKOCYTE ESTERASE UR QL STRIP.AUTO: ABNORMAL
LYMPHOCYTES # BLD AUTO: 0.5 X10*3/UL (ref 0.8–3)
LYMPHOCYTES NFR BLD AUTO: 9.1 %
MCH RBC QN AUTO: 32.5 PG (ref 26–34)
MCHC RBC AUTO-ENTMCNC: 32.6 G/DL (ref 32–36)
MCV RBC AUTO: 100 FL (ref 80–100)
MONOCYTES # BLD AUTO: 0.62 X10*3/UL (ref 0.05–0.8)
MONOCYTES NFR BLD AUTO: 11.3 %
MUCOUS THREADS #/AREA URNS AUTO: ABNORMAL /LPF
NEUTROPHILS # BLD AUTO: 4.27 X10*3/UL (ref 1.6–5.5)
NEUTROPHILS NFR BLD AUTO: 78.1 %
NITRITE UR QL STRIP.AUTO: POSITIVE
NRBC BLD-RTO: 0 /100 WBCS (ref 0–0)
PH UR STRIP.AUTO: 7 [PH]
PLATELET # BLD AUTO: 194 X10*3/UL (ref 150–450)
PMV BLD AUTO: 10 FL (ref 7.5–11.5)
POTASSIUM SERPL-SCNC: 3.2 MMOL/L (ref 3.5–5.3)
PROT SERPL-MCNC: 5.4 G/DL (ref 6.4–8.2)
PROT UR STRIP.AUTO-MCNC: NEGATIVE MG/DL
RBC # BLD AUTO: 3.6 X10*6/UL (ref 4–5.2)
RBC # UR STRIP.AUTO: ABNORMAL /UL
RBC #/AREA URNS AUTO: ABNORMAL /HPF
SODIUM SERPL-SCNC: 137 MMOL/L (ref 136–145)
SP GR UR STRIP.AUTO: 1
SQUAMOUS #/AREA URNS AUTO: ABNORMAL /HPF
UROBILINOGEN UR STRIP.AUTO-MCNC: <2 MG/DL
WBC # BLD AUTO: 5.5 X10*3/UL (ref 4.4–11.3)
WBC #/AREA URNS AUTO: >50 /HPF
WBC CLUMPS #/AREA URNS AUTO: ABNORMAL /HPF

## 2023-09-30 PROCEDURE — 99283 EMERGENCY DEPT VISIT LOW MDM: CPT | Mod: 25

## 2023-09-30 PROCEDURE — 99284 EMERGENCY DEPT VISIT MOD MDM: CPT | Performed by: STUDENT IN AN ORGANIZED HEALTH CARE EDUCATION/TRAINING PROGRAM

## 2023-09-30 PROCEDURE — 80053 COMPREHEN METABOLIC PANEL: CPT | Performed by: STUDENT IN AN ORGANIZED HEALTH CARE EDUCATION/TRAINING PROGRAM

## 2023-09-30 PROCEDURE — 85025 COMPLETE CBC W/AUTO DIFF WBC: CPT | Performed by: STUDENT IN AN ORGANIZED HEALTH CARE EDUCATION/TRAINING PROGRAM

## 2023-09-30 PROCEDURE — 2500000004 HC RX 250 GENERAL PHARMACY W/ HCPCS (ALT 636 FOR OP/ED): Performed by: STUDENT IN AN ORGANIZED HEALTH CARE EDUCATION/TRAINING PROGRAM

## 2023-09-30 PROCEDURE — 2500000001 HC RX 250 WO HCPCS SELF ADMINISTERED DRUGS (ALT 637 FOR MEDICARE OP): Performed by: STUDENT IN AN ORGANIZED HEALTH CARE EDUCATION/TRAINING PROGRAM

## 2023-09-30 PROCEDURE — 81001 URINALYSIS AUTO W/SCOPE: CPT | Performed by: STUDENT IN AN ORGANIZED HEALTH CARE EDUCATION/TRAINING PROGRAM

## 2023-09-30 RX ORDER — POTASSIUM CHLORIDE 1.5 G/1.58G
40 POWDER, FOR SOLUTION ORAL ONCE
Status: COMPLETED | OUTPATIENT
Start: 2023-09-30 | End: 2023-09-30

## 2023-09-30 RX ORDER — CEPHALEXIN 500 MG/1
500 CAPSULE ORAL 2 TIMES DAILY
Qty: 10 CAPSULE | Refills: 0 | Status: SHIPPED | OUTPATIENT
Start: 2023-09-30 | End: 2023-09-30 | Stop reason: SDUPTHER

## 2023-09-30 RX ORDER — CEPHALEXIN 500 MG/1
500 CAPSULE ORAL ONCE
Status: COMPLETED | OUTPATIENT
Start: 2023-09-30 | End: 2023-09-30

## 2023-09-30 RX ORDER — CEPHALEXIN 500 MG/1
500 CAPSULE ORAL 2 TIMES DAILY
Qty: 10 CAPSULE | Refills: 0 | Status: SHIPPED | OUTPATIENT
Start: 2023-09-30 | End: 2023-10-05

## 2023-09-30 RX ADMIN — CEPHALEXIN 500 MG: 500 CAPSULE ORAL at 19:35

## 2023-09-30 RX ADMIN — POTASSIUM CHLORIDE 40 MEQ: 1.5 FOR SOLUTION ORAL at 18:40

## 2023-09-30 RX ADMIN — SODIUM CHLORIDE 1000 ML: 9 INJECTION, SOLUTION INTRAVENOUS at 16:05

## 2023-09-30 ASSESSMENT — COLUMBIA-SUICIDE SEVERITY RATING SCALE - C-SSRS
6. HAVE YOU EVER DONE ANYTHING, STARTED TO DO ANYTHING, OR PREPARED TO DO ANYTHING TO END YOUR LIFE?: NO
2. HAVE YOU ACTUALLY HAD ANY THOUGHTS OF KILLING YOURSELF?: NO
1. IN THE PAST MONTH, HAVE YOU WISHED YOU WERE DEAD OR WISHED YOU COULD GO TO SLEEP AND NOT WAKE UP?: NO

## 2023-09-30 ASSESSMENT — PAIN - FUNCTIONAL ASSESSMENT: PAIN_FUNCTIONAL_ASSESSMENT: 0-10

## 2023-09-30 ASSESSMENT — PAIN SCALES - GENERAL: PAINLEVEL_OUTOF10: 0 - NO PAIN

## 2023-09-30 NOTE — ED PROVIDER NOTES
HPI   Chief Complaint   Patient presents with    Diarrhea     PT TO ED VIA EMS WITH C/O DIARRHEA X3 WEEKS. PT ALSO C/O WEAKNESS PAST FEW DAYS. DENIES ABDOMINAL PAIN, N/V, CP, DIZZINESS       87-year-old female presents with complaint of diarrhea.  Patient states she has had history of frequent diarrhea and is following up with a gastroenterologist Dr. Kang for evaluation.  She states she has had some worsening watery diarrhea mainly at nighttime over the past 2 weeks.  She states she did start a new medication Xifaxan around 1 week ago and has not had any improvement of her symptoms.  She states she was told to come here by her daughter for evaluation of possible dehydration and electrolyte derangement.  She does states some mild lightheadedness.  She otherwise denies any hematochezia, fever, chills, chest pain, shortness of breath, abdominal pain, dysuria, hematuria.                          Reagan Coma Scale Score: 15                  Patient History   Past Medical History:   Diagnosis Date    Acute sinusitis, unspecified 08/13/2013    Acute sinusitis    Adjustment disorder with depressed mood 05/01/2017    Grieving    Candidal esophagitis (CMS/[facility]) 12/09/2020    Candida esophagitis    Familial hypercholesterolemia 12/09/2020    Familial hypercholesteremia    Lymphocytic colitis 08/28/2018    Lymphocytic colitis    Pain in unspecified joint     Joint pain    Personal history of other diseases of the circulatory system 05/16/2018    History of sinus bradycardia    Personal history of other diseases of the digestive system 08/28/2018    History of gastroesophageal reflux (GERD)    Personal history of other diseases of the digestive system     History of esophageal reflux    Radiculopathy, cervical region     Cervical radiculopathy    Radiculopathy, lumbosacral region     Lumbosacral radiculopathy at L5    Rheumatic diseases of endocardium, 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    Rheumatic disease of heart valve      Past Surgical History:   Procedure Laterality Date    MR HEAD ANGIO WO IV CONTRAST  9/9/2021    MR HEAD ANGIO WO IV CONTRAST 9/9/2021 [facility]    MR NECK ANGIO WO IV CONTRAST  9/9/2021    MR NECK ANGIO WO IV CONTRAST 9/9/2021 [facility]    OTHER SURGICAL HISTORY  06/10/2013    Endoscopic Control Of Gastric Bleeding    OTHER SURGICAL HISTORY  01/04/2019    Hysterectomy    OTHER SURGICAL HISTORY  01/22/2019    Ankle surgery    OTHER SURGICAL HISTORY  01/22/2019    Cornea transplantation    OTHER SURGICAL HISTORY  01/22/2019    Colostomy    OTHER SURGICAL HISTORY  01/22/2019    Knee replacement     Family History   Problem Relation Name Age of Onset    Bipolar disorder Mother      Depression Mother      Parkinsonism Mother      Other (Cardiac disorder) Father      Hypertension Father      Other (Glioblastoma) Daughter      Cancer Other Grandmother     Bipolar disorder Other Family      Social History     Tobacco Use    Smoking status: Former     Years: 40     Types: Cigarettes    Smokeless tobacco: Never   Vaping Use    Vaping Use: Never used   Substance Use Topics    Alcohol use: Not on file    Drug use: Not on file       Physical Exam   ED Triage Vitals [09/30/23 1542]   Temp Heart Rate Resp BP   36.3 °C (97.4 °F) 61 16 124/85      SpO2 Temp src Heart Rate Source Patient Position   96 % -- -- --      BP Location FiO2 (%)     -- --       Physical Exam  Constitutional:       Appearance: Normal appearance. She is normal weight.   HENT:      Head: Normocephalic and atraumatic.      Right Ear: Tympanic membrane normal.      Left Ear: Tympanic membrane normal.      Nose: Nose normal.      Mouth/Throat:      Mouth: Mucous membranes are dry.   Eyes:      Extraocular Movements: Extraocular movements intact.      Conjunctiva/sclera: Conjunctivae normal.      Pupils: Pupils are equal, round, and reactive to light.   Cardiovascular:      Rate and Rhythm: Normal rate and regular rhythm.   Pulmonary:       Effort: Pulmonary effort is normal.      Breath sounds: Normal breath sounds.   Abdominal:      General: Abdomen is flat. Bowel sounds are normal.      Palpations: Abdomen is soft.      Tenderness: There is no abdominal tenderness.   Musculoskeletal:         General: Normal range of motion.      Cervical back: Normal range of motion and neck supple.   Skin:     General: Skin is warm and dry.      Capillary Refill: Capillary refill takes less than 2 seconds.   Neurological:      General: No focal deficit present.      Mental Status: She is alert and oriented to person, place, and time. Mental status is at baseline.   Psychiatric:         Mood and Affect: Mood normal.         Behavior: Behavior normal.         ED Course & MDM   Diagnoses as of 09/30/23 1943   Functional diarrhea   Hypokalemia   Acute UTI       Medical Decision Making  87-year-old female presents with complaint of diarrhea.  Patient otherwise well-appearing with no significant reproducible abdominal pain on initial examination.  Patient is mainly here due to complaint of some worsening weakness as well as functional diarrhea which she has been treated with her gastroenterologist Dr. Kang with multiple medications as well as recent initiation of Xifanax.  She is initially hemodynamically stable and afebrile.  She was noted to have slow atrial fibrillation on the monitor with a heart rate as low as the 40s.  An EKG was obtained which did show confirmed slow atrial fibrillation with a rate of 40 bpm with a low voltage QRS otherwise no new acute ischemic changes noted.  This is known to the patient chronic in nature.  On reexamination she had a heart rate in the 60s.  She was given 1 L normal saline for fluid resuscitation.  Initial lab work did show hypokalemia of 3.2 and elevated BUN of 26 with otherwise normal renal function.  This is concern for dehydration most likely loss from GI output.  No leukocytosis and stable anemia noted with a hemoglobin  11.7.  Urinalysis does show large leukocyte esterase and greater than 50 white cells concerning for a urinary tract infection.  Patient did have significant relief from examination wish to be discharged home.  Here with the daughter.  Informed of her findings and given a dose of Keflex and will be discharged home with additional 5-day course for treatment of her UTI.  She will follow-up with her gastroenterologist for further examination.  Discharged return precautions apparent to follow.        Procedure  Procedures     Jah Rucker DO  09/30/23 1952

## 2023-10-02 NOTE — OP NOTE
Post Operative Note:     PreOp Diagnosis: Lumbar spinal stenosis with neurogenic  claudication   Post-Procedure Diagnosis: Lumbar spinal stenosis  with neurogenic claudication   Procedure: 1. BILATERAL L4 TRANSFORAMINAL EPIDURAL  STEROID INJECTION  2. Fluoroscopic guidance  3. Moderate sedation   Surgeon: Naren Cruz MD PHD   Resident/Fellow/Other Assistant: Clemente Haines DO   Estimated Blood Loss (mL): none   Specimen: no   Findings: none     Operative Report Dictated:  Dictation: not applicable - note contains Operative  Report   Operative Report:    Ms. Adelina Case is a 87-year-old female presents with complaints of lower back pain with radicular symptoms.  She has previously received a bilateral L4 transforaminal  epidural steroid injection with adequate pain relief.  Previously, the patient has also had the MILD procedure.  She is here today for repeat of the same bilateral L4 transforaminal epidural steroid injection.    Patient was identified in preoperative holding. Appropriate consent was attained and the neck was appropriately marked with a marker. An IV was placed in the right hand by nursing. The patient was brought to the operating room and a time out procedure  was performed. The patient was placed into the prone position with appropriate padding at pressure points. The area was prepped and vigorously cleaned in the usual way with Chloraprep. Sterile towels were laid down to isolate the field. Fluoroscopy was  used to identify the L4-5  intervertebral space in the AP view. A right oblique view was used to identify the right intervertebral foramen of L4-5. The skin and subcutaneous tissue was anesthestized with lidocaine 5 mL 0.5%. A 90 mm 22g Sprotte needle  was then inserted into the subcutaneous tissue and guided to the level of the foramen under the oblique view in a coaxial approach. The same procedure was repeated on the left. The appropriate depth was confirmed with lateral view. 1 ml  Radiopaque contrast  was injected into the space under live fluoroscopy which showed epidural spread along the path of the targeted spinal nerve in AP view. 0.5 mL (5 mg) of dexamethasone and 0.5 mL 0.5% lidocaine was injected into each side. The patient tolerated the procedure  well and was returned to the PACU for recovery.    Attestation:   Note Completion:  I am a: Resident/Fellow   Attending Attestation I was present for the entire procedure          Electronic Signatures:  Clemente Haines (DO (Resident))  (Signed 11-Jul-2023 11:12)   Authored: Post Operative Note, Note Completion  Naren Cruz)  (Signed 11-Jul-2023 11:15)   Authored: Post Operative Note, Note Completion   Co-Signer: Post Operative Note, Note Completion      Last Updated: 11-Jul-2023 11:15 by Naren Cruz)

## 2023-10-04 ENCOUNTER — OFFICE VISIT (OUTPATIENT)
Dept: PRIMARY CARE | Facility: CLINIC | Age: 88
End: 2023-10-04
Payer: MEDICARE

## 2023-10-04 ENCOUNTER — LAB (OUTPATIENT)
Dept: LAB | Facility: LAB | Age: 88
End: 2023-10-04
Payer: MEDICARE

## 2023-10-04 VITALS — SYSTOLIC BLOOD PRESSURE: 110 MMHG | DIASTOLIC BLOOD PRESSURE: 60 MMHG

## 2023-10-04 DIAGNOSIS — E87.6 HYPOKALEMIA: ICD-10-CM

## 2023-10-04 DIAGNOSIS — R13.10 DYSPHAGIA, UNSPECIFIED TYPE: Primary | ICD-10-CM

## 2023-10-04 DIAGNOSIS — I48.19 ATRIAL FIBRILLATION, PERSISTENT (MULTI): ICD-10-CM

## 2023-10-04 DIAGNOSIS — I10 PRIMARY HYPERTENSION: ICD-10-CM

## 2023-10-04 DIAGNOSIS — Z95.818 PRESENCE OF WATCHMAN LEFT ATRIAL APPENDAGE CLOSURE DEVICE: ICD-10-CM

## 2023-10-04 PROBLEM — Z86.79 H/O: RHEUMATIC FEVER: Status: RESOLVED | Noted: 2023-04-25 | Resolved: 2023-10-04

## 2023-10-04 LAB
ANION GAP SERPL CALC-SCNC: 10 MMOL/L (ref 10–20)
BUN SERPL-MCNC: 18 MG/DL (ref 6–23)
CALCIUM SERPL-MCNC: 10.3 MG/DL (ref 8.6–10.3)
CHLORIDE SERPL-SCNC: 102 MMOL/L (ref 98–107)
CO2 SERPL-SCNC: 32 MMOL/L (ref 21–32)
CREAT SERPL-MCNC: 0.91 MG/DL (ref 0.5–1.05)
GFR SERPL CREATININE-BSD FRML MDRD: 62 ML/MIN/1.73M*2
GLUCOSE SERPL-MCNC: 120 MG/DL (ref 74–99)
POTASSIUM SERPL-SCNC: 3.8 MMOL/L (ref 3.5–5.3)
SODIUM SERPL-SCNC: 140 MMOL/L (ref 136–145)

## 2023-10-04 PROCEDURE — 36415 COLL VENOUS BLD VENIPUNCTURE: CPT

## 2023-10-04 PROCEDURE — 1036F TOBACCO NON-USER: CPT | Performed by: INTERNAL MEDICINE

## 2023-10-04 PROCEDURE — G0008 ADMIN INFLUENZA VIRUS VAC: HCPCS | Performed by: INTERNAL MEDICINE

## 2023-10-04 PROCEDURE — 1159F MED LIST DOCD IN RCRD: CPT | Performed by: INTERNAL MEDICINE

## 2023-10-04 PROCEDURE — 99214 OFFICE O/P EST MOD 30 MIN: CPT | Performed by: INTERNAL MEDICINE

## 2023-10-04 PROCEDURE — 3074F SYST BP LT 130 MM HG: CPT | Performed by: INTERNAL MEDICINE

## 2023-10-04 PROCEDURE — 3078F DIAST BP <80 MM HG: CPT | Performed by: INTERNAL MEDICINE

## 2023-10-04 PROCEDURE — 90662 IIV NO PRSV INCREASED AG IM: CPT | Performed by: INTERNAL MEDICINE

## 2023-10-04 PROCEDURE — 1160F RVW MEDS BY RX/DR IN RCRD: CPT | Performed by: INTERNAL MEDICINE

## 2023-10-04 PROCEDURE — 80048 BASIC METABOLIC PNL TOTAL CA: CPT

## 2023-10-04 PROCEDURE — 1126F AMNT PAIN NOTED NONE PRSNT: CPT | Performed by: INTERNAL MEDICINE

## 2023-10-04 RX ORDER — RIFAXIMIN 550 MG/1
550 TABLET ORAL 3 TIMES DAILY
COMMUNITY
Start: 2023-09-20 | End: 2023-10-09 | Stop reason: SDUPTHER

## 2023-10-04 NOTE — PATIENT INSTRUCTIONS
It was good to see you.  I am glad you are doing a little better.  Unfortunately I do not think they did a culture on your urine but we will just have you complete your antibiotics at this time.  Be sure to drink plenty of fluids.  Your blood pressure is a bit low.  I would like you to monitor your blood pressure a few times a week also with your heart rate.  Please check in with us in the next couple of weeks with your blood pressure and heart rate readings  We will obtain a blood test today to look at your potassium  High-dose flu vaccine was given today  I do recommend COVID-19 vaccination as well as RSV vaccine

## 2023-10-04 NOTE — PROGRESS NOTES
Subjective   Patient ID: Adelina Case is a 88 y.o. female.    HPI  Patient presents today in follow-up from recent emergency room visit  She is having more issues with diarrhea Sept 11 called GI  Xifaxan    and just now feels  like maybe improved   She felt very weak and daughter Barbara had noted some mental status changes as well after evaluation the thought was this was more dehydration and functional diarrhea she was given a liter of fluid felt better and was sent home  She does see Dr Kang and is treated for collagenous colitis  Urinalysis was positive for white cells and red cells thought perhaps this represented an early UTI was given Keflex unfortunately do not think a culture was obtained  Past medical history significant for  Atrial fibrillation status post Watchman procedure  Parkinson's disease  Chronic peripheral edema  Cognitive impairment    Review of Systems    Objective   Physical Exam  Well-developed no acute distress somewhat flat affect but overall seems to be doing well  HEENT exam there is a stye present left lower eyelid  Lungs are clear to auscultation and percussion  Cardiovascular irregular rate and rhythm though heart rate is about 72 today  Periphery is with edema 1-2+ bilaterally and she does have on support stockings  She walks with a walker  Lab Results   Component Value Date    GLUCOSE 129 (H) 09/30/2023    CALCIUM 9.4 09/30/2023     09/30/2023    K 3.2 (L) 09/30/2023    CO2 29 09/30/2023     09/30/2023    BUN 26 (H) 09/30/2023    CREATININE 1.01 09/30/2023        Assessment/Plan   #1  Diarrhea with history of collagenous colitis follows routinely with gastroenterologist had been started on Xifaxan several days prior to her ER visit however she was feeling dehydrated diarrhea now seems to have improved she does not feel dehydrated she has completed her course of Xifaxan  2.  Dehydration again was given a liter of fluid felt much better given potassium as well  3.   Hypokalemia potassium was low was given potassium in the ER no repeat potassium was done we will repeat her potassium today  4.  Parkinson's seems that she has been pretty stable she follows routinely at the [facility] she has been concerned about some issues swallowing and they had wanted her to see speech therapy she has had difficulty getting in to speech therapy at [facility] I have placed referral sounds like they did not want her to do an actual swallow study just to see speech therapy  5.  Atrial fibrillation status post Watchman procedure seems to be doing well  6.  Hypertension she is on losartan 50 mg daily also takes metoprolol 12.5 mg daily her blood pressure is somewhat low would like her to monitor her blood pressure at home if she is consistently around 110 or less systolic we may decrease the losartan I have discussed that metoprolol is there also for rate control though her heart rate was pretty low in the ER it is fine today around 76  7.  Question of UTI was treated empirically I do not see a culture she will complete her antibiotic seems to be doing a little better though did not have a lot of urinary symptoms was more mental status change  8.  Health maintenance  High-dose flu vaccine is given today  COVID-19 vaccination recommended I do recommend RSV vaccine for her as well she does have a history of COPD also  35 minutes were spent with patient of which greater than 50% was spent in counseling and coordination of care  This note was partially generated using the Dragon voice recognition system.  There may be some incorrect wording ,grammar, spelling or punctuation errors that were not corrected prior to committing the note to the medical record.

## 2023-10-05 DIAGNOSIS — K21.9 GASTROESOPHAGEAL REFLUX DISEASE, UNSPECIFIED WHETHER ESOPHAGITIS PRESENT: ICD-10-CM

## 2023-10-05 RX ORDER — PANTOPRAZOLE SODIUM 40 MG/1
40 TABLET, DELAYED RELEASE ORAL
Qty: 90 TABLET | Refills: 0 | Status: SHIPPED | OUTPATIENT
Start: 2023-10-05 | End: 2023-12-11

## 2023-10-06 ENCOUNTER — TELEPHONE (OUTPATIENT)
Dept: GASTROENTEROLOGY | Facility: CLINIC | Age: 88
End: 2023-10-06
Payer: MEDICARE

## 2023-10-06 ENCOUNTER — TELEPHONE (OUTPATIENT)
Dept: PRIMARY CARE | Facility: CLINIC | Age: 88
End: 2023-10-06
Payer: MEDICARE

## 2023-10-06 NOTE — TELEPHONE ENCOUNTER
She is having diarrhea again. She found 3 days left over of Xifaxin and wants to know if she can take them? She tried her GI doctor and he is out until Monday.

## 2023-10-09 DIAGNOSIS — K58.0 IRRITABLE BOWEL SYNDROME WITH DIARRHEA: Primary | ICD-10-CM

## 2023-10-09 RX ORDER — RIFAXIMIN 550 MG/1
550 TABLET ORAL 3 TIMES DAILY
Qty: 42 TABLET | Refills: 1 | Status: SHIPPED | OUTPATIENT
Start: 2023-10-09 | End: 2024-01-29 | Stop reason: WASHOUT

## 2023-10-09 NOTE — TELEPHONE ENCOUNTER
Pt states that she finished the Xifaxan and still have symptoms. She was the ER for low potassium and bladder infection. She states that she still has Xifaxan from when she had to take them a while back and wants to know if she should finish taking those as well.

## 2023-10-11 ENCOUNTER — LAB (OUTPATIENT)
Dept: LAB | Facility: LAB | Age: 88
End: 2023-10-11
Payer: MEDICARE

## 2023-10-11 ENCOUNTER — TELEPHONE (OUTPATIENT)
Dept: PRIMARY CARE | Facility: CLINIC | Age: 88
End: 2023-10-11

## 2023-10-11 DIAGNOSIS — R35.0 URINARY FREQUENCY: ICD-10-CM

## 2023-10-11 DIAGNOSIS — R35.0 URINARY FREQUENCY: Primary | ICD-10-CM

## 2023-10-11 DIAGNOSIS — M48.062 NEUROGENIC CLAUDICATION DUE TO LUMBAR SPINAL STENOSIS: Primary | ICD-10-CM

## 2023-10-11 LAB
APPEARANCE UR: ABNORMAL
BACTERIA #/AREA URNS AUTO: ABNORMAL /HPF
BILIRUB UR STRIP.AUTO-MCNC: NEGATIVE MG/DL
COLOR UR: YELLOW
GLUCOSE UR STRIP.AUTO-MCNC: NEGATIVE MG/DL
HYALINE CASTS #/AREA URNS AUTO: ABNORMAL /LPF
KETONES UR STRIP.AUTO-MCNC: ABNORMAL MG/DL
LEUKOCYTE ESTERASE UR QL STRIP.AUTO: ABNORMAL
MUCOUS THREADS #/AREA URNS AUTO: ABNORMAL /LPF
NITRITE UR QL STRIP.AUTO: NEGATIVE
PH UR STRIP.AUTO: 5 [PH]
PROT UR STRIP.AUTO-MCNC: ABNORMAL MG/DL
RBC # UR STRIP.AUTO: ABNORMAL /UL
RBC #/AREA URNS AUTO: ABNORMAL /HPF
SP GR UR STRIP.AUTO: 1.01
UROBILINOGEN UR STRIP.AUTO-MCNC: <2 MG/DL
WBC #/AREA URNS AUTO: ABNORMAL /HPF

## 2023-10-11 PROCEDURE — 87086 URINE CULTURE/COLONY COUNT: CPT

## 2023-10-11 PROCEDURE — 81001 URINALYSIS AUTO W/SCOPE: CPT

## 2023-10-12 ENCOUNTER — OFFICE VISIT (OUTPATIENT)
Dept: CARDIOLOGY | Facility: CLINIC | Age: 88
End: 2023-10-12
Payer: MEDICARE

## 2023-10-12 ENCOUNTER — HOSPITAL ENCOUNTER (OUTPATIENT)
Dept: CARDIOLOGY | Facility: CLINIC | Age: 88
Discharge: HOME | End: 2023-10-12
Payer: MEDICARE

## 2023-10-12 ENCOUNTER — LAB (OUTPATIENT)
Dept: LAB | Facility: LAB | Age: 88
End: 2023-10-12
Payer: MEDICARE

## 2023-10-12 VITALS
DIASTOLIC BLOOD PRESSURE: 78 MMHG | SYSTOLIC BLOOD PRESSURE: 118 MMHG | HEART RATE: 58 BPM | HEIGHT: 66 IN | OXYGEN SATURATION: 93 % | WEIGHT: 149.2 LBS | BODY MASS INDEX: 23.98 KG/M2

## 2023-10-12 DIAGNOSIS — I48.19 ATRIAL FIBRILLATION, PERSISTENT (MULTI): ICD-10-CM

## 2023-10-12 DIAGNOSIS — I48.19 OTHER PERSISTENT ATRIAL FIBRILLATION (MULTI): ICD-10-CM

## 2023-10-12 DIAGNOSIS — I48.20 CHRONIC ATRIAL FIBRILLATION (MULTI): Primary | ICD-10-CM

## 2023-10-12 DIAGNOSIS — I48.19 OTHER PERSISTENT ATRIAL FIBRILLATION (MULTI): Primary | ICD-10-CM

## 2023-10-12 LAB
BACTERIA UR CULT: NORMAL
DIGOXIN SERPL-MCNC: 1.93 NG/ML (ref 0.8–?)

## 2023-10-12 PROCEDURE — 99214 OFFICE O/P EST MOD 30 MIN: CPT | Mod: PO,25 | Performed by: NURSE PRACTITIONER

## 2023-10-12 PROCEDURE — 93005 ELECTROCARDIOGRAM TRACING: CPT

## 2023-10-12 PROCEDURE — 93010 ELECTROCARDIOGRAM REPORT: CPT | Performed by: INTERNAL MEDICINE

## 2023-10-12 PROCEDURE — 3078F DIAST BP <80 MM HG: CPT | Performed by: NURSE PRACTITIONER

## 2023-10-12 PROCEDURE — 1126F AMNT PAIN NOTED NONE PRSNT: CPT | Performed by: NURSE PRACTITIONER

## 2023-10-12 PROCEDURE — 99214 OFFICE O/P EST MOD 30 MIN: CPT | Performed by: NURSE PRACTITIONER

## 2023-10-12 PROCEDURE — 1036F TOBACCO NON-USER: CPT | Performed by: NURSE PRACTITIONER

## 2023-10-12 PROCEDURE — 36415 COLL VENOUS BLD VENIPUNCTURE: CPT

## 2023-10-12 PROCEDURE — 1159F MED LIST DOCD IN RCRD: CPT | Performed by: NURSE PRACTITIONER

## 2023-10-12 PROCEDURE — 3074F SYST BP LT 130 MM HG: CPT | Performed by: NURSE PRACTITIONER

## 2023-10-12 PROCEDURE — 80162 ASSAY OF DIGOXIN TOTAL: CPT

## 2023-10-12 PROCEDURE — 1160F RVW MEDS BY RX/DR IN RCRD: CPT | Performed by: NURSE PRACTITIONER

## 2023-10-12 RX ORDER — DIGOXIN 125 MCG
125 TABLET ORAL DAILY
Qty: 90 TABLET | Refills: 3 | Status: SHIPPED | OUTPATIENT
Start: 2023-10-12 | End: 2023-10-13 | Stop reason: SDUPTHER

## 2023-10-12 ASSESSMENT — COLUMBIA-SUICIDE SEVERITY RATING SCALE - C-SSRS
1. IN THE PAST MONTH, HAVE YOU WISHED YOU WERE DEAD OR WISHED YOU COULD GO TO SLEEP AND NOT WAKE UP?: NO
2. HAVE YOU ACTUALLY HAD ANY THOUGHTS OF KILLING YOURSELF?: NO
6. HAVE YOU EVER DONE ANYTHING, STARTED TO DO ANYTHING, OR PREPARED TO DO ANYTHING TO END YOUR LIFE?: NO

## 2023-10-12 ASSESSMENT — ENCOUNTER SYMPTOMS
OCCASIONAL FEELINGS OF UNSTEADINESS: 1
LOSS OF SENSATION IN FEET: 1
DEPRESSION: 1

## 2023-10-12 ASSESSMENT — PAIN SCALES - GENERAL: PAINLEVEL: 0-NO PAIN

## 2023-10-12 ASSESSMENT — PATIENT HEALTH QUESTIONNAIRE - PHQ9
SUM OF ALL RESPONSES TO PHQ9 QUESTIONS 1 AND 2: 0
1. LITTLE INTEREST OR PLEASURE IN DOING THINGS: NOT AT ALL
2. FEELING DOWN, DEPRESSED OR HOPELESS: NOT AT ALL

## 2023-10-12 NOTE — PROGRESS NOTES
History Of Present Illness:    Adelina Case is a 88 y.o. year old female patient with GERD, MARYANN (intolerant of CPAP), HTN, pulm HTN, COPD, CKD, RHD, TIA 9/21, HFpEF(managed by Dr. PADILLA Huddleston), Parkinson's 2018, parox->persistent AF (failed sotalol and then eventually failed Tikosyn, currently on rate control strategy w/dig and metop, s/p Watchman implant 9/22/22 ([facility]).    Last seen by Dr. Kimball 6mo ago. Rates doing well. Metop changed to evening to see if helps with daytime sleepiness. Dig level on higher end of normal then, so dig decreased to 6 days/wk.    TODAY routine 6mo follow up. She recently has been dealing with recurrence of diarrhea. Her daughter took her to ED 9/30/23 as she was weaker, showing some confusion. ECG in ED showed AF w/ rate 40 bpm, narrow qrs, BP stable, no LH/dizziness. She was given IVF and electrolytes replenished (was hypokalemic), Hrs back to baseline high 50-60s,  GI following, currently back on Xifaxan (has been on this in past for diarrhea).  Currently denies any cp, sob at rest, (+baseline BUSTAMANTE/no worse/seems stable),  +LE edema (baseline/wearing compression). No LH/dizziness. Still with diarrhea, none this morning. Daughter is making sure she is staying hydrated with electrolytes and consuming nutrient dense foods.  Urine sent yesterday by PCP to recheck for UTI (was on Keflex).    Past Medical History:  Past Medical History:   Diagnosis Date    Acute sinusitis, unspecified 08/13/2013    Acute sinusitis    Adjustment disorder with depressed mood 05/01/2017    Grieving    Candidal esophagitis (CMS/[facility]) 12/09/2020    Candida esophagitis    Familial hypercholesterolemia 12/09/2020    Familial hypercholesteremia    Lymphocytic colitis 08/28/2018    Lymphocytic colitis    Pain in unspecified joint     Joint pain    Personal history of other diseases of the circulatory system 05/16/2018    History of sinus bradycardia    Personal history of other diseases of the digestive system  08/28/2018    History of gastroesophageal reflux (GERD)    Personal history of other diseases of the digestive system     History of esophageal reflux    Radiculopathy, cervical region     Cervical radiculopathy    Radiculopathy, lumbosacral region     Lumbosacral radiculopathy at L5    Rheumatic diseases of endocardium, 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    Rheumatic disease of heart valve       Past Surgical History:  Past Surgical History:   Procedure Laterality Date    MR HEAD ANGIO WO IV CONTRAST  9/9/2021    MR HEAD ANGIO WO IV CONTRAST 9/9/2021 [facility]    MR NECK ANGIO WO IV CONTRAST  9/9/2021    MR NECK ANGIO WO IV CONTRAST 9/9/2021 [facility]    OTHER SURGICAL HISTORY  06/10/2013    Endoscopic Control Of Gastric Bleeding    OTHER SURGICAL HISTORY  01/04/2019    Hysterectomy    OTHER SURGICAL HISTORY  01/22/2019    Ankle surgery    OTHER SURGICAL HISTORY  01/22/2019    Cornea transplantation    OTHER SURGICAL HISTORY  01/22/2019    Colostomy    OTHER SURGICAL HISTORY  01/22/2019    Knee replacement          Social History:  Safe at Home: yes  Work: retired    Substances: no  Alcohol (CAGE): denies   Smoking: no  Drugs:no      Family History:  Family History   Problem Relation Name Age of Onset    Bipolar disorder Mother      Depression Mother      Parkinsonism Mother      Other (Cardiac disorder) Father      Hypertension Father      Other (Glioblastoma) Daughter      Cancer Other Grandmother     Bipolar disorder Other Family          Allergies:  Allergies   Allergen Reactions    Morphine Itching    Hydrochlorothiazide Rash        Outpatient Medications:  Current Outpatient Medications   Medication Instructions    amoxicillin (Amoxil) 500 mg capsule TAKE 4 CAPSULES BY MOUTH ONE HOUR PRIOR TO DENTAL TREATMENT    BABY ASPIRIN ORAL 81 mg, oral, Daily    biotin 5 mg capsule oral, Take as directed    budesonide EC (ENTOCORT EC) 6 mg, oral, Daily RT    busPIRone (BUSPAR) 15 mg, oral, 2 times daily  "   carbidopa-levodopa (Sinemet)  mg tablet Take 2 tablets in the morning; and 2 tablets at noon , 1.5 evening.    cholecalciferol (Vitamin D3) 50 mcg (2,000 unit) capsule Take as directed    cyanocobalamin (VITAMIN B-12) 1,000 mcg, oral, Daily RT    dexAMETHasone (Decadron) 0.1 % ophthalmic solution INSTILL 1 DROP INTO BOTH EYES EVERY DAY AS DIRECTED    digoxin (LANOXIN) 125 mcg, oral, Daily, Takes daily, except for Sundays.    DULoxetine (CYMBALTA) 30 mg, oral, 2 times daily    famotidine (Pepcid) 20 mg tablet 1 tablet, oral, Daily    ferrous gluconate (Fergon) 240 (27 Fe) MG tablet 1 tablet, oral, Daily    furosemide (Lasix) 40 mg tablet TAKE 3 TABLET DAILY TAKE 1 TABLET 2 TIMES A DAY (AM AND EARLY PM) IF NEED, CAN TAKE EXTRA 1 TABLET    Incruse Ellipta 62.5 mcg/actuation inhalation 1 puff, inhalation, Daily    LORazepam (ATIVAN) 0.5 mg, oral, Daily PRN    losartan (COZAAR) 50 mg, oral, Daily    melatonin 3 mg tablet 2 tablets, oral, Nightly    metoprolol succinate XL (TOPROL-XL) 12.5 mg, oral, Daily    metroNIDAZOLE (Metrogel) 0.75 % gel Apply daily for rosacea    mirabegron (Mybetriq) 25 mg tablet extended release 24 hr 24 hr tablet 1 tablet, oral, Daily    oxygen (O2) gas therapy 2 Liters via NC with sleep    pantoprazole (PROTONIX) 40 mg, oral, Daily before breakfast    rivastigmine (Exelon) 4.6 mg/24 hour     simvastatin (ZOCOR) 20 mg, oral, Daily    traZODone (Desyrel) 50 mg tablet 1 tablet, oral, Nightly    turmeric root extract 500 mg tablet Take as directed    Xifaxan 550 mg, oral, 3 times daily         Last Recorded Vitals:      9/30/2023     3:42 PM 9/30/2023     4:36 PM 9/30/2023     4:37 PM 9/30/2023     5:58 PM 9/30/2023     7:42 PM 10/4/2023     1:09 PM 10/12/2023     9:17 AM   Vitals   Systolic 124 128  159  110 118   Diastolic 85 95  79  60 78   Heart Rate 61 43  54 65  58   Temp 36.3 °C (97.4 °F)         Resp 16 19  16      Height (in)   1.676 m (5' 6\")    1.676 m (5' 6\")   Weight (lb) 145  " 148    149.2   BMI 23.4 kg/m2  23.89 kg/m2    24.08 kg/m2   BSA (m2) 1.75 m2  1.77 m2    1.78 m2   Visit Report      Report Report      Vitals:    10/12/23 0917   BP: 118/78   Pulse: 58   SpO2: 93%      ROS:  Constitutional: feeling tired,  no fever and no chills.   Cardiovascular: no chest pain, no tightness or heavy pressure, no shortness of breath at rest, no palpitations, baseline lower extremity edema-wearing compression socks and as noted in HPI.   Respiratory: baseline shortness of breath during exertion and no orthopnea.   Gastrointestinal: + diarrhea, no nausea and no vomiting, no melena.   Genitourinary: no hematuria.   Musculoskeletal: using walker for walking.   Neurological: no dizziness and no fainting.       Physical Exam:  Constitutional: alert and in no acute distress.   Eyes: no erythema  Neck: neck is supple, symmetric, trachea midline, no masses .   Pulmonary: no increased work of breathing or signs of respiratory distress and lungs clear to auscultation.    Cardiovascular: carotid pulses 2+ bilaterally with no bruit , JVP was normal, no thrills , irregularly irregular rhythm, normal S1 and S2, 3+ SE murmur, + BLE edema at shins, wearing compression .   Abdomen: abdomen non-tender, no masses .   Skin: skin warm and dry, normal skin turgor .   Psychiatric judgment and insight is normal  and oriented to person, place and time .       Last Cardiology Tests:  ECG:  10/12/23 AF 54 bpm, QRS 94 bpm  Echo:  CONCLUSIONS: 4/2022   1. The left ventricular systolic function is normal with a 65-70% estimated ejection fraction.   2. Poorly visualized anatomical structures due to suboptimal image quality.   3. Spectral Doppler shows a pseudonormal pattern of left ventricular diastolic filling.   4. Borderline increased LV mass.   5. The left atrium is moderate to severely dilated.   6. There is moderate tricuspid regurgitation.   7. Moderate aortic valve stenosis.   8. Severely elevated pulmonary artery  pressure.    [id number]:   [id number]   1. Heart Failure or EF less than or equal to 35%? Yes (1 pt)   2. Hypertension? Yes (1 pt)   3. Age? Greater than or equal to 75 (2 pt)   4. Diabetes? No (0 pt)   5. Stroke, TIA, or Systemic Emboli? Yes (2 pt)   6. Vascular Disease? No (0 pt)   7. Gender? Female (1 pt)   Total Risk Score: The [id number] Score is 7 which corresponds to High Risk. - She has Watchman device now.     Assessment/Plan   Adelina Case is a 88 y.o. year old female patient with GERD, MARYANN (intolerant of CPAP), HTN, pulm HTN, COPD, CKD, RHD, TIA 9/21, HFpEF(managed by Dr. PADILLA Huddleston), Parkinson's 2018, parox->persistent AF (failed sotalol and then eventually failed Tikosyn, currently on rate control strategy w/dig and metop, s/p Watchman implant 9/22/22 ([facility]).      TODAY routine 6mo follow up. She is doing well from the EP standpoint despite her current situation with excessive diarrhea (had ED admission for it, see HPI).  Her rates are controlled on the metop ER 12.5 daily and digoxin 125 mcg 6 days week.  HRS are high 50s-60s. Bps at home are good and in office 118/78. She denies any feelings of LH/dizziness. Her recent creatinine is stable and potassium is back to 3.8.  Daughter is with her and making sure she is staying hydrated with electrolytes and consuming nutrient dense foods.      PLAN  Check dig level today  She has good rate control. At some point we may be able to tailor back as time goes on.   Continue further cardiovascular management with Dr. Kuldip Huddleston     Reviewed with collaborating physician Dr. Rehana MOREL CNP

## 2023-10-13 DIAGNOSIS — I48.20 CHRONIC ATRIAL FIBRILLATION (MULTI): ICD-10-CM

## 2023-10-13 RX ORDER — DIGOXIN 125 MCG
125 TABLET ORAL DAILY
Qty: 90 TABLET | Refills: 3 | Status: SHIPPED | OUTPATIENT
Start: 2023-10-13 | End: 2024-01-29 | Stop reason: SDUPTHER

## 2023-10-13 NOTE — PROGRESS NOTES
Called and spoke with pt. Her Digoxin level is at the higher end of normal. Dr. Kimball recommending that we decrease the digoxin from 6 days a week to 5 days a week.   Pt agreed.   Left message on her daughters voicemail as well, she helps her with her medications.

## 2023-10-17 ENCOUNTER — APPOINTMENT (OUTPATIENT)
Dept: RADIOLOGY | Facility: HOSPITAL | Age: 88
End: 2023-10-17
Payer: MEDICARE

## 2023-10-18 DIAGNOSIS — F41.9 ANXIETY: ICD-10-CM

## 2023-10-18 RX ORDER — LORAZEPAM 0.5 MG/1
0.5 TABLET ORAL DAILY PRN
Qty: 10 TABLET | Refills: 0 | Status: SHIPPED | OUTPATIENT
Start: 2023-10-18 | End: 2024-01-02 | Stop reason: SDUPTHER

## 2023-10-19 ENCOUNTER — TELEPHONE (OUTPATIENT)
Dept: GASTROENTEROLOGY | Facility: CLINIC | Age: 88
End: 2023-10-19
Payer: MEDICARE

## 2023-10-19 NOTE — TELEPHONE ENCOUNTER
Pt states that tomorrow is her last day taking the Xifaxan and c/o still having the diarrhea. What should she do?

## 2023-10-25 LAB
ATRIAL RATE: 53 BPM
Q ONSET: 213 MS
QRS COUNT: 9 BEATS
QRS DURATION: 94 MS
QT INTERVAL: 338 MS
QTC CALCULATION(BAZETT): 320 MS
QTC FREDERICIA: 326 MS
R AXIS: 27 DEGREES
T AXIS: -70 DEGREES
T OFFSET: 382 MS
VENTRICULAR RATE: 54 BPM

## 2023-10-27 ENCOUNTER — OFFICE VISIT (OUTPATIENT)
Dept: PRIMARY CARE | Facility: CLINIC | Age: 88
End: 2023-10-27
Payer: MEDICARE

## 2023-10-27 VITALS — SYSTOLIC BLOOD PRESSURE: 128 MMHG | DIASTOLIC BLOOD PRESSURE: 68 MMHG

## 2023-10-27 DIAGNOSIS — I50.30 HEART FAILURE WITH PRESERVED LEFT VENTRICULAR FUNCTION (HFPEF) (MULTI): ICD-10-CM

## 2023-10-27 DIAGNOSIS — I48.19 ATRIAL FIBRILLATION, PERSISTENT (MULTI): Primary | ICD-10-CM

## 2023-10-27 DIAGNOSIS — I10 PRIMARY HYPERTENSION: ICD-10-CM

## 2023-10-27 PROCEDURE — 3074F SYST BP LT 130 MM HG: CPT | Performed by: INTERNAL MEDICINE

## 2023-10-27 PROCEDURE — 99214 OFFICE O/P EST MOD 30 MIN: CPT | Performed by: INTERNAL MEDICINE

## 2023-10-27 PROCEDURE — 1126F AMNT PAIN NOTED NONE PRSNT: CPT | Performed by: INTERNAL MEDICINE

## 2023-10-27 PROCEDURE — 1036F TOBACCO NON-USER: CPT | Performed by: INTERNAL MEDICINE

## 2023-10-27 PROCEDURE — 3078F DIAST BP <80 MM HG: CPT | Performed by: INTERNAL MEDICINE

## 2023-10-27 PROCEDURE — 1159F MED LIST DOCD IN RCRD: CPT | Performed by: INTERNAL MEDICINE

## 2023-10-27 PROCEDURE — 1160F RVW MEDS BY RX/DR IN RCRD: CPT | Performed by: INTERNAL MEDICINE

## 2023-10-27 NOTE — PROGRESS NOTES
Subjective   Patient ID: Adelina Case is a 88 y.o. female.    HPI  Here today for followup   Her legs are more swollen than normal   she normally does use her support stockings but does not have them on today just so we could evaluate  Thinks they have become more swollen over about the last 2 weeks  She denies any change in her diet she probably is less active than normal not always great about getting her legs up when she sits down  There have been no changes in her medications  She does not have a history of congestive heart failure with preserved left ventricular ejection fracture she has had chronic edema for some time  Parkinson's  Atrial fibrillation status post Watchman procedure    She  A bit anxious because  Daughter is in [place] till the first week of November her friend Liza is with her and she does have other home care workers as well  Does not feel any more short of breath than normal      Review of Systems    Objective   Physical Exam  Elderly age-appropriate overweight no acute distress  HEENT exam is unremarkable  Lungs diminished breath sounds throughout all lung fields but no crackles or rales noted  Cardiovascular irregular rate and rhythm there is a 2/6 systolic murmur present  The periphery is with edema  1-2 plus     Assessment/Plan   1 Peripheral edema she has baseline peripheral edema but certainly worse than her baseline at this time etiology unclear she has become less active she does not have her support stockings on today but she is usually pretty good about wearing them her lungs are clear she has bumped her furosemide to 60 mg but twice daily just for the last day we will continue this regimen I have asked her to make sure she keeps her legs elevated wears her support stockings as well Daily weights recommended hesitant to bump her furosemide much more than this  2.  Hypertension blood pressure is in good control  3.  Parkinson's she is debilitated with the Parkinson's spending a  lot of time sitting has a wheelchair today  4.  Anxiety she inquired about increasing dose of lorazepam have discussed that do not want to do this as it is an addictive medication has too many side effects we have talked about this in the past as well she thinks it is more situational with her daughter out of town  5.  Health maintenance  I have recommended COVID-19 booster as well as RSV  35 minutes were spent with patient of which greater than 50% was spent in counseling and coordination of care  This note was partially generated using the Dragon voice recognition system.  There may be some incorrect wording ,grammar, spelling or punctuation errors that were not corrected prior to committing the note to the medical record.

## 2023-10-27 NOTE — PATIENT INSTRUCTIONS
Think the swelling in your legs is multifactorial but mostly from the fact that the veins just do not work as well as they should.  I am okay with you taking 1-1/2 tablets of the furosemide twice daily or 60 mg twice daily.  Hesitant to increase this much more  Be sure to wear your support stockings  Keep your legs elevated anytime you are sitting down  Stay as active as possible  Watch salt intake as well  I do recommend daily weights so we can compare  I recommend Covid 19 booster vaccination as well as RSV vaccine

## 2023-11-01 ENCOUNTER — TELEPHONE (OUTPATIENT)
Dept: GASTROENTEROLOGY | Facility: CLINIC | Age: 88
End: 2023-11-01
Payer: MEDICARE

## 2023-11-03 ENCOUNTER — TELEPHONE (OUTPATIENT)
Dept: PRIMARY CARE | Facility: CLINIC | Age: 88
End: 2023-11-03
Payer: MEDICARE

## 2023-11-03 DIAGNOSIS — F51.01 PRIMARY INSOMNIA: Primary | ICD-10-CM

## 2023-11-03 DIAGNOSIS — F41.9 ANXIETY: ICD-10-CM

## 2023-11-03 NOTE — TELEPHONE ENCOUNTER
Pt called her legs are still swollen and weepy really not any better, she still has diarrhea can't see GI until next week and is gagging and chocking when she eats now. I just told them to try softer bland foods make sure she is hydrating, elevating her legs ect but they would really like to speak with you later..

## 2023-11-07 ENCOUNTER — OFFICE VISIT (OUTPATIENT)
Dept: GASTROENTEROLOGY | Facility: CLINIC | Age: 88
End: 2023-11-07
Payer: MEDICARE

## 2023-11-07 VITALS — HEART RATE: 58 BPM | OXYGEN SATURATION: 92 %

## 2023-11-07 DIAGNOSIS — K52.9 CHRONIC DIARRHEA: Primary | ICD-10-CM

## 2023-11-07 DIAGNOSIS — K52.832 LYMPHOCYTIC COLITIS: ICD-10-CM

## 2023-11-07 DIAGNOSIS — K58.0 IRRITABLE BOWEL SYNDROME WITH DIARRHEA: ICD-10-CM

## 2023-11-07 PROCEDURE — 1126F AMNT PAIN NOTED NONE PRSNT: CPT | Performed by: INTERNAL MEDICINE

## 2023-11-07 PROCEDURE — 1160F RVW MEDS BY RX/DR IN RCRD: CPT | Performed by: INTERNAL MEDICINE

## 2023-11-07 PROCEDURE — 99215 OFFICE O/P EST HI 40 MIN: CPT | Performed by: INTERNAL MEDICINE

## 2023-11-07 PROCEDURE — 1036F TOBACCO NON-USER: CPT | Performed by: INTERNAL MEDICINE

## 2023-11-07 PROCEDURE — 1159F MED LIST DOCD IN RCRD: CPT | Performed by: INTERNAL MEDICINE

## 2023-11-07 PROCEDURE — 3074F SYST BP LT 130 MM HG: CPT | Performed by: INTERNAL MEDICINE

## 2023-11-07 PROCEDURE — 3078F DIAST BP <80 MM HG: CPT | Performed by: INTERNAL MEDICINE

## 2023-11-07 ASSESSMENT — ENCOUNTER SYMPTOMS
CHILLS: 0
MYALGIAS: 0
SHORTNESS OF BREATH: 0
UNEXPECTED WEIGHT CHANGE: 1
FEVER: 0
FATIGUE: 0
HEADACHES: 0
ARTHRALGIAS: 0
DIFFICULTY URINATING: 0

## 2023-11-07 NOTE — PATIENT INSTRUCTIONS
I would recommend increasing the budesonide back to 9mg (3 pills) daily to see if this helps - you should notice a difference in a week or two.  But let me know if you don't notice any improvement.    Try taking Align once daily too to see if a probiotic helps.

## 2023-11-07 NOTE — PROGRESS NOTES
Subjective     History of Present Illness   Adelina Case is a 88 y.o. female with PMHx of AFib s/p [place], Plavix use, PD, multiple abdominal surgeries including sigmoid colectomy, lymphocytic colitis, COPD who presents to GI clinic for follow up.  Last seen around 8 months ago for follow up for diarrhea that was stable at that time after 2 courses of Xifaxan.  Since then she has called and had recurrence of the diarrhea despite another course of Xifaxan.  Her daughter is here with her to help provide history and contribute to the plan  Had recurrence mid-September, did OK with Xifaxan after 2 weeks  However diarrhea came back  Down to 3mg budesonide per day, trying to taper off completely  Taking Metamucil daily and Imodium TID (regular Imodium for the last 10 days or so)  Still having watery stools,   Hasn't been able to go anywhere for the past 5 weeks  Has lost weight recently - around 10 pounds the past month  Has fatigue and has lost sense of smell    Past history:  Diarrhea is doing very well, no longer worried about her bowels during the day  Having BMs in the middle of the night  She has to go twice in the night - will go once, but doesn't empty all the way so has to go back  Doesn't really have BMs during the day  She wakes up frequently at night anyway, which she states is due to her Parkinson's  Stools are formed  She stopped taking the morning pantoprazole for a while  Most reflux complaints in the afternoons or evenings  Has been taking 2 budesonide daily for a couple of months, not needing any Imodium  Had CT A/P 1/12/23 due to abdominal pain that only showed constipation (large amount of stool) and gallstones with ventral hernias  She is doing better - stools are still loose but they are much better  Taking cholestyramine as well, unsure if it is doing anything  Wondering which of the medications we can take away  We discussed the pathophysiology of SIBO in further detail    Review of  Systems  Review of Systems   Constitutional:  Positive for unexpected weight change. Negative for chills, fatigue and fever.   Respiratory:  Negative for shortness of breath.    Cardiovascular:  Positive for leg swelling. Negative for chest pain.   Genitourinary:  Negative for difficulty urinating.   Musculoskeletal:  Negative for arthralgias and myalgias.   Neurological:  Negative for headaches.   All other systems reviewed and are negative.      Allergies  Allergies   Allergen Reactions    Morphine Itching    Hydrochlorothiazide Rash       Medications  Current Outpatient Medications   Medication Instructions    amoxicillin (Amoxil) 500 mg capsule TAKE 4 CAPSULES BY MOUTH ONE HOUR PRIOR TO DENTAL TREATMENT    BABY ASPIRIN ORAL 81 mg, oral, Daily    biotin 5 mg capsule oral, Take as directed    budesonide EC (ENTOCORT EC) 6 mg, oral, Daily RT    busPIRone (BUSPAR) 15 mg, oral, 2 times daily    carbidopa-levodopa (Sinemet)  mg tablet Take 2 tablets in the morning; and 2 tablets at noon , 1.5 evening.    cholecalciferol (Vitamin D3) 50 mcg (2,000 unit) capsule Take as directed    cyanocobalamin (VITAMIN B-12) 1,000 mcg, oral, Daily RT    dexAMETHasone (Decadron) 0.1 % ophthalmic solution INSTILL 1 DROP INTO BOTH EYES EVERY DAY AS DIRECTED    digoxin (LANOXIN) 125 mcg, oral, Daily, Take 1 tablet 5 days of the week.    DULoxetine (CYMBALTA) 30 mg, oral, 2 times daily    famotidine (Pepcid) 20 mg tablet 1 tablet, oral, Daily    ferrous gluconate (Fergon) 240 (27 Fe) MG tablet 1 tablet, oral, Daily    furosemide (Lasix) 40 mg tablet TAKE 3 TABLET DAILY TAKE 1 TABLET 2 TIMES A DAY (AM AND EARLY PM) IF NEED, CAN TAKE EXTRA 1 TABLET    Incruse Ellipta 62.5 mcg/actuation inhalation 1 puff, inhalation, Daily    LORazepam (ATIVAN) 0.5 mg, oral, Daily PRN    losartan (COZAAR) 50 mg, oral, Daily    melatonin 3 mg tablet 2 tablets, oral, Nightly    metoprolol succinate XL (TOPROL-XL) 12.5 mg, oral, Daily    metroNIDAZOLE  (Metrogel) 0.75 % gel Apply daily for rosacea    mirabegron (Mybetriq) 25 mg tablet extended release 24 hr 24 hr tablet 1 tablet, oral, Daily    oxygen (O2) gas therapy 2 Liters via NC with sleep    pantoprazole (PROTONIX) 40 mg, oral, Daily before breakfast    rivastigmine (Exelon) 4.6 mg/24 hour     simvastatin (ZOCOR) 20 mg, oral, Daily    traZODone (Desyrel) 50 mg tablet 1 tablet, oral, Nightly    turmeric root extract 500 mg tablet Take as directed    Xifaxan 550 mg, oral, 3 times daily        Objective     Visit Vitals  Pulse 58   SpO2 92%   Smoking Status Former       Physical Exam  Constitutional:       General: She is not in acute distress.     Comments: Frail-appearing, walks with walker   Eyes:      Extraocular Movements: Extraocular movements intact.   Abdominal:      General: Bowel sounds are normal. There is distension.      Palpations: Abdomen is soft.      Tenderness: There is no abdominal tenderness. There is no guarding or rebound.   Musculoskeletal:      Right lower leg: Edema present.      Left lower leg: Edema present.   Skin:     General: Skin is warm and dry.   Neurological:      General: No focal deficit present.      Mental Status: She is alert.   Psychiatric:         Mood and Affect: Mood normal.         Behavior: Behavior normal.           Lab Results   Component Value Date    WBC 5.5 09/30/2023    WBC 5.5 04/21/2023    HGB 11.7 (L) 09/30/2023    HGB 12.0 04/21/2023    HCT 35.9 (L) 09/30/2023    HCT 38.0 04/21/2023     09/30/2023     (H) 04/21/2023     09/30/2023     04/21/2023     Lab Results   Component Value Date     10/04/2023     09/30/2023    K 3.8 10/04/2023    K 3.2 (L) 09/30/2023     10/04/2023     09/30/2023    CO2 32 10/04/2023    CO2 29 09/30/2023    BUN 18 10/04/2023    BUN 26 (H) 09/30/2023    CREATININE 0.91 10/04/2023    CREATININE 1.01 09/30/2023    CALCIUM 10.3 10/04/2023    CALCIUM 9.4 09/30/2023    PROT 5.4 (L)  09/30/2023    PROT 5.9 (L) 04/21/2023    BILITOT 1.0 09/30/2023    BILITOT 1.3 (H) 04/21/2023    ALKPHOS 62 09/30/2023    ALKPHOS 62 04/21/2023    ALT 3 (L) 09/30/2023    ALT 8 04/21/2023    AST 15 09/30/2023    AST 11 04/21/2023    GLUCOSE 120 (H) 10/04/2023    GLUCOSE 129 (H) 09/30/2023       Recent Imaging  ECG 12 lead (Clinic Performed)    Result Date: 10/25/2023  Atrial fibrillation with slow ventricular response ST & T wave abnormality, consider inferior ischemia or digitalis effect Abnormal ECG When compared with ECG of 30-SEP-2023 16:43, Inverted T waves have replaced nonspecific T wave abnormality in Lateral leads Confirmed by Robert Huddleston ([id number]) on 10/25/2023 7:46:24 PM      Assessment/Plan    Adelina Case is a 88 y.o. female who presents to GI clinic for follow up for chronic diarrhea.    Chronic diarrhea  She has had ongoing diarrhea that did resolve last year after a 4-week course of Xifaxan, suggesting either IBS-D or SIBO as the etiology.  However she also has lymphocytic colitis which can cause chronic diarrhea.  She had recurrence of diarrhea the last month or so, and did not really respond to a 4-week course of Xifaxan like before.  She has been decreasing her budesonide and is down to 3mg daily, also has been losing some weight.  This would suggest that her lymphocytic colitis could be flaring up and causing the symptoms this time.  - she will increase budesonide to 9mg daily for at least 2 weeks to see if this helps improve her diarrhea  - if higher budesonide dose helps, then we would continue 9mg for a month and then taper back down  - if no improvement with higher dose of budesonide, could consider further workup with stool testing and/or imaging  - also discussed a trial of a probiotic such as Align or Culturelle since she is interested in trying this  - she should continue taking fiber supplementation for bulking and also scheduled Imodium    Lymphocytic colitis  See A/P for chronic  diarrhea.       Ole Ballard MD

## 2023-11-09 RX ORDER — MIRTAZAPINE 7.5 MG/1
7.5 TABLET, FILM COATED ORAL NIGHTLY
Qty: 30 TABLET | Refills: 2 | Status: SHIPPED | OUTPATIENT
Start: 2023-11-09 | End: 2023-12-03

## 2023-11-09 NOTE — ASSESSMENT & PLAN NOTE
She has had ongoing diarrhea that did resolve last year after a 4-week course of Xifaxan, suggesting either IBS-D or SIBO as the etiology.  However she also has lymphocytic colitis which can cause chronic diarrhea.  She had recurrence of diarrhea the last month or so, and did not really respond to a 4-week course of Xifaxan like before.  She has been decreasing her budesonide and is down to 3mg daily, also has been losing some weight.  This would suggest that her lymphocytic colitis could be flaring up and causing the symptoms this time.  - she will increase budesonide to 9mg daily for at least 2 weeks to see if this helps improve her diarrhea  - if higher budesonide dose helps, then we would continue 9mg for a month and then taper back down  - if no improvement with higher dose of budesonide, could consider further workup with stool testing and/or imaging  - also discussed a trial of a probiotic such as Align or Culturelle since she is interested in trying this  - she should continue taking fiber supplementation for bulking and also scheduled Imodium

## 2023-11-13 ENCOUNTER — ANCILLARY PROCEDURE (OUTPATIENT)
Dept: RADIOLOGY | Facility: CLINIC | Age: 88
End: 2023-11-13
Payer: MEDICARE

## 2023-11-13 ENCOUNTER — TELEPHONE (OUTPATIENT)
Dept: PRIMARY CARE | Facility: CLINIC | Age: 88
End: 2023-11-13
Payer: MEDICARE

## 2023-11-13 DIAGNOSIS — S20.211A RIB CONTUSION, RIGHT, INITIAL ENCOUNTER: Primary | ICD-10-CM

## 2023-11-13 DIAGNOSIS — S20.211A RIB CONTUSION, RIGHT, INITIAL ENCOUNTER: ICD-10-CM

## 2023-11-13 PROCEDURE — 71101 X-RAY EXAM UNILAT RIBS/CHEST: CPT | Mod: RT

## 2023-11-13 PROCEDURE — 71101 X-RAY EXAM UNILAT RIBS/CHEST: CPT | Mod: RIGHT SIDE | Performed by: RADIOLOGY

## 2023-11-13 NOTE — TELEPHONE ENCOUNTER
She fell on Friday and has some bruising on right side and some SOB but Liyah said she is doing better and wants an xray for more curiosity can you order one? Oxygen was 91. Call Barbara at [phone]

## 2023-11-13 NOTE — TELEPHONE ENCOUNTER
She put her on 2 liters of 02 and her oxygen was 93-94 and bumped her up to 3 liters and it is 98.

## 2023-11-14 ENCOUNTER — APPOINTMENT (OUTPATIENT)
Dept: RADIOLOGY | Facility: HOSPITAL | Age: 88
End: 2023-11-14
Payer: MEDICARE

## 2023-11-16 ENCOUNTER — OFFICE VISIT (OUTPATIENT)
Dept: ORTHOPEDIC SURGERY | Facility: HOSPITAL | Age: 88
End: 2023-11-16
Payer: MEDICARE

## 2023-11-16 DIAGNOSIS — M25.511 CHRONIC RIGHT SHOULDER PAIN: Primary | ICD-10-CM

## 2023-11-16 DIAGNOSIS — G89.29 CHRONIC RIGHT SHOULDER PAIN: Primary | ICD-10-CM

## 2023-11-16 PROCEDURE — 99213 OFFICE O/P EST LOW 20 MIN: CPT | Performed by: ORTHOPAEDIC SURGERY

## 2023-11-16 PROCEDURE — 1159F MED LIST DOCD IN RCRD: CPT | Performed by: ORTHOPAEDIC SURGERY

## 2023-11-16 PROCEDURE — 20610 DRAIN/INJ JOINT/BURSA W/O US: CPT | Performed by: ORTHOPAEDIC SURGERY

## 2023-11-16 PROCEDURE — 1160F RVW MEDS BY RX/DR IN RCRD: CPT | Performed by: ORTHOPAEDIC SURGERY

## 2023-11-16 PROCEDURE — 1036F TOBACCO NON-USER: CPT | Performed by: ORTHOPAEDIC SURGERY

## 2023-11-16 PROCEDURE — 1126F AMNT PAIN NOTED NONE PRSNT: CPT | Performed by: ORTHOPAEDIC SURGERY

## 2023-11-16 NOTE — PROGRESS NOTES
Subjective    Patient ID: Adelina Case is a 88 y.o. female.    Chief Complaint: No chief complaint on file.     Last Surgery: No surgery found  Last Surgery Date: No surgery found    The patient is a 86 year old right-hand-dominant female presenting today for evaluation of her right shoulder. She reports that she ruptured her biceps in May 2020. She denies any injury. Her shoulder has bothered her since then but is now worse. She reports history of knee replacement. Previous injection provided significant relief. She returns for repeat injection today.     11/16/23  Adelina returns to the clinic today for a repeat follow up visit regarding her right shoulder.     She is here today for a repeat injection. She is still having pain and the injections are helping. Her left shoulder is a bit painful due to overcompensation.     She had a fall yesterday that resulted in a fractured ribs. She is having some difficulty breathing due to this.      Past medical history, surgical history, social history, and family history were all reviewed and are as per the [facility] patient health history questionnaire form that I signed and scanned into the chart today.          Objective   Patient is a well-developed, well-nourished female in no acute distress. Breathes with normal chest rises. Eye exam reveals round pupils. Awake alert and oriented x3.     Examination of the left shoulder today reveals the skin to be intact. There is no sign any atrophy, lesions, or abrasions. There is no pain to palpation of the bony prominences. Cervical lymphadenopathy examined, and this was negative.  Patient had 5 out of 5 wrist flexors and extension, and thumb extension bilaterally. Sensation was intact to light touch to median, ulnar, radial, axillary, and musculocutaneous nerves bilaterally. Positive radial pulse bilaterally. Provocative maneuvers on the left side today were negative.  Range of motion of the left shoulder revealed 0-140° of forward  elevation. 0-60° of external rotation. Internal rotation was to T 12.     Examination of the right shoulder today reveals the skin to be intact. 120 degrees forward elevation. Pain along the biceps tendon.       Image Results:      Patient ID: Adelina Case is a 88 y.o. female.    L Inj/Asp: R subacromial bursa on 11/16/2023 10:58 AM  Indications: pain  Details: 20 G needle, anterior approach  Outcome: tolerated well, no immediate complications  Procedure, treatment alternatives, risks and benefits explained, specific risks discussed. Consent was given by the patient. Immediately prior to procedure a time out was called to verify the correct patient, procedure, equipment, support staff and site/side marked as required. Patient was prepped and draped in the usual sterile fashion.           Assessment/Plan   Encounter Diagnoses:  No diagnosis found.  Patient with right cuff tear arthropathy.     At this time we had a long discussion about the various options for shoulder arthritis.  1. Do nothing. Continue activity modifications.  2. Consider cortisone injections into the glenohumeral joint. This would give pain relief that is temporary. This would not stop the progression of arthritis. For some patients, this injection can last not at all, for 2 weeks, 4 weeks, 3 months or longer. The risk of the injection is fairly minimal but does include a very small chance of infection.  3. A total shoulder replacement is the third option. This will give the patient a more permanent solution to pain relief. It would also improve function. There is no rush to this procedure it is an elective procedure.     She tolerated the injection well. She understands that she can get an injection every 3 months. Reverse shoulder replacement is the last resort. We discussed surgery a little more today. She has a history of Parkinson's. While a reverse shoulder replacement is in theory possible at her age will have to see. I recommended  that she continue with injections as they have been quite effective for her. She tolerated the injection well.    No orders of the defined types were placed in this encounter.    Follow up in 3 months    Scribe Attestation  By signing my name below, I, Nay Limaibmaury   attest that this documentation has been prepared under the direction and in the presence of Roque Hayes MD.

## 2023-11-21 ENCOUNTER — APPOINTMENT (OUTPATIENT)
Dept: RADIOLOGY | Facility: HOSPITAL | Age: 88
End: 2023-11-21
Payer: MEDICARE

## 2023-11-29 DIAGNOSIS — I10 PRIMARY HYPERTENSION: ICD-10-CM

## 2023-11-29 RX ORDER — LOSARTAN POTASSIUM 50 MG/1
50 TABLET ORAL DAILY
Qty: 90 TABLET | Refills: 3 | Status: SHIPPED | OUTPATIENT
Start: 2023-11-29 | End: 2024-01-02 | Stop reason: SDUPTHER

## 2023-12-03 DIAGNOSIS — F41.9 ANXIETY: ICD-10-CM

## 2023-12-03 DIAGNOSIS — F51.01 PRIMARY INSOMNIA: ICD-10-CM

## 2023-12-03 RX ORDER — MIRTAZAPINE 7.5 MG/1
7.5 TABLET, FILM COATED ORAL NIGHTLY
Qty: 90 TABLET | Refills: 3 | Status: SHIPPED | OUTPATIENT
Start: 2023-12-03 | End: 2024-11-27

## 2023-12-11 DIAGNOSIS — E78.00 HYPERCHOLESTEROLEMIA: Primary | ICD-10-CM

## 2023-12-11 DIAGNOSIS — K21.9 GASTROESOPHAGEAL REFLUX DISEASE, UNSPECIFIED WHETHER ESOPHAGITIS PRESENT: ICD-10-CM

## 2023-12-11 RX ORDER — PANTOPRAZOLE SODIUM 40 MG/1
40 TABLET, DELAYED RELEASE ORAL
Qty: 90 TABLET | Refills: 2 | Status: SHIPPED | OUTPATIENT
Start: 2023-12-11 | End: 2024-05-29 | Stop reason: SDUPTHER

## 2023-12-11 RX ORDER — SIMVASTATIN 20 MG/1
20 TABLET, FILM COATED ORAL DAILY
Qty: 90 TABLET | Refills: 3 | Status: SHIPPED | OUTPATIENT
Start: 2023-12-11

## 2024-01-02 ENCOUNTER — PATIENT MESSAGE (OUTPATIENT)
Dept: PRIMARY CARE | Facility: CLINIC | Age: 89
End: 2024-01-02
Payer: MEDICARE

## 2024-01-02 ENCOUNTER — DOCUMENTATION (OUTPATIENT)
Dept: GASTROENTEROLOGY | Facility: CLINIC | Age: 89
End: 2024-01-02
Payer: MEDICARE

## 2024-01-02 DIAGNOSIS — I10 PRIMARY HYPERTENSION: ICD-10-CM

## 2024-01-02 DIAGNOSIS — F41.9 ANXIETY: ICD-10-CM

## 2024-01-02 RX ORDER — LOSARTAN POTASSIUM 50 MG/1
50 TABLET ORAL DAILY
Qty: 90 TABLET | Refills: 3 | Status: SHIPPED | OUTPATIENT
Start: 2024-01-02

## 2024-01-02 RX ORDER — LORAZEPAM 0.5 MG/1
0.5 TABLET ORAL DAILY PRN
Qty: 10 TABLET | Refills: 0 | Status: SHIPPED | OUTPATIENT
Start: 2024-01-02 | End: 2024-03-30 | Stop reason: SDUPTHER

## 2024-01-13 ENCOUNTER — APPOINTMENT (OUTPATIENT)
Dept: RADIOLOGY | Facility: HOSPITAL | Age: 89
End: 2024-01-13
Payer: MEDICARE

## 2024-01-13 ENCOUNTER — HOSPITAL ENCOUNTER (EMERGENCY)
Facility: HOSPITAL | Age: 89
Discharge: HOME | End: 2024-01-13
Attending: EMERGENCY MEDICINE
Payer: MEDICARE

## 2024-01-13 VITALS
WEIGHT: 150 LBS | BODY MASS INDEX: 24.11 KG/M2 | TEMPERATURE: 97.5 F | RESPIRATION RATE: 18 BRPM | HEIGHT: 66 IN | HEART RATE: 64 BPM | SYSTOLIC BLOOD PRESSURE: 163 MMHG | DIASTOLIC BLOOD PRESSURE: 84 MMHG
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
## 2024-01-13 DIAGNOSIS — S61.511A TEAR OF SKIN OF RIGHT WRIST, INITIAL ENCOUNTER: ICD-10-CM

## 2024-01-13 PROCEDURE — 73110 X-RAY EXAM OF WRIST: CPT | Mod: RIGHT SIDE | Performed by: STUDENT IN AN ORGANIZED HEALTH CARE EDUCATION/TRAINING PROGRAM

## 2024-01-13 PROCEDURE — 72125 CT NECK SPINE W/O DYE: CPT

## 2024-01-13 PROCEDURE — 73590 X-RAY EXAM OF LOWER LEG: CPT | Performed by: STUDENT IN AN ORGANIZED HEALTH CARE EDUCATION/TRAINING PROGRAM

## 2024-01-13 PROCEDURE — 71045 X-RAY EXAM CHEST 1 VIEW: CPT | Performed by: STUDENT IN AN ORGANIZED HEALTH CARE EDUCATION/TRAINING PROGRAM

## 2024-01-13 PROCEDURE — 73610 X-RAY EXAM OF ANKLE: CPT | Mod: LT

## 2024-01-13 PROCEDURE — 73590 X-RAY EXAM OF LOWER LEG: CPT | Mod: LT

## 2024-01-13 PROCEDURE — 73110 X-RAY EXAM OF WRIST: CPT | Mod: RT

## 2024-01-13 PROCEDURE — 72170 X-RAY EXAM OF PELVIS: CPT | Performed by: STUDENT IN AN ORGANIZED HEALTH CARE EDUCATION/TRAINING PROGRAM

## 2024-01-13 PROCEDURE — 72125 CT NECK SPINE W/O DYE: CPT | Performed by: STUDENT IN AN ORGANIZED HEALTH CARE EDUCATION/TRAINING PROGRAM

## 2024-01-13 PROCEDURE — 73610 X-RAY EXAM OF ANKLE: CPT | Performed by: STUDENT IN AN ORGANIZED HEALTH CARE EDUCATION/TRAINING PROGRAM

## 2024-01-13 PROCEDURE — 70450 CT HEAD/BRAIN W/O DYE: CPT | Performed by: STUDENT IN AN ORGANIZED HEALTH CARE EDUCATION/TRAINING PROGRAM

## 2024-01-13 PROCEDURE — 72170 X-RAY EXAM OF PELVIS: CPT

## 2024-01-13 PROCEDURE — 99285 EMERGENCY DEPT VISIT HI MDM: CPT | Performed by: EMERGENCY MEDICINE

## 2024-01-13 PROCEDURE — 71045 X-RAY EXAM CHEST 1 VIEW: CPT

## 2024-01-13 PROCEDURE — 70450 CT HEAD/BRAIN W/O DYE: CPT

## 2024-01-13 ASSESSMENT — PAIN SCALES - GENERAL
PAINLEVEL_OUTOF10: 0 - NO PAIN
PAINLEVEL_OUTOF10: 4
PAINLEVEL_OUTOF10: 0 - NO PAIN

## 2024-01-13 ASSESSMENT — PAIN - FUNCTIONAL ASSESSMENT: PAIN_FUNCTIONAL_ASSESSMENT: 0-10

## 2024-01-13 NOTE — ED PROVIDER NOTES
HPI   No chief complaint on file.      HPI  HISTORY OF PRESENT ILLNESS:  88 y.o. female who presents to the ED via EMS with complaint of a fall that occurred about 30 minutes prior to arrival.  Patient states that she was pulling down her pants to use the bathroom, lost balance, and slowly slid to the ground.  She states she usually ambulates with a walker, but was not holding onto the walker, and therefore lost her balance.  She sustained a laceration to the right wrist, and is complaining of pain in the left shin.  Per EMS has a skin tear on the wrist.  Is up-to-date on tetanus. Daughter accompanies her to the ED, and states there is an area of new bruising over the left shin.  Also notes she has a history of a left ankle replacement.  Patient states she is unsure if she hit her head or not.  She did not lose consciousness.  She denies a headache.  No neck pain.  No back pain.  No pain in the chest, abdomen, right lower extremity, or upper extremities.  Denies decreased range of motion or swelling.  Daughter states the patient is at baseline and is acting her normal self.  She does have a history of multiple falls due to Parkinson's.  She also has a history of COPD and is on 2 L oxygen at home.  Denies chest pain or shortness of breath.  No nausea or vomiting.  No confusion.  No dizziness or lightheadedness. Denies weakness, numbness, or tingling of extremities. No blurry vision or vision loss. No drug or alcohol use. No use of anticoagulant, only takes a baby aspirin daily. No history of bleeding or clotting disorders. No other complaints or symptoms voiced.    PMH: CKD, GERD, CHF on lasix, dementia, AFib s/p watchman procedure on digoxin and metoprolol, COPD, HLD, aortic stenosis, Parkinson's disease, HTN, MARAYNN, pulm HTN, hx TIA  Family history: noncontributory  Social history: non smoker, no ETOH, no illicit substances    12 point review of systems was performed and is negative unless otherwise specified in  HPI.        No data recorded                Patient History   Past Medical History:   Diagnosis Date    Acute sinusitis, unspecified 08/13/2013    Acute sinusitis    Adjustment disorder with depressed mood 05/01/2017    Grieving    Candidal esophagitis (CMS/HCC) 12/09/2020    Candida esophagitis    Familial hypercholesterolemia 12/09/2020    Familial hypercholesteremia    Lymphocytic colitis 08/28/2018    Lymphocytic colitis    Pain in unspecified joint     Joint pain    Personal history of other diseases of the circulatory system 05/16/2018    History of sinus bradycardia    Personal history of other diseases of the digestive system 08/28/2018    History of gastroesophageal reflux (GERD)    Personal history of other diseases of the digestive system     History of esophageal reflux    Radiculopathy, cervical region     Cervical radiculopathy    Radiculopathy, lumbosacral region     Lumbosacral radiculopathy at L5    Rheumatic diseases of endocardium, 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    Rheumatic disease of heart valve     Past Surgical History:   Procedure Laterality Date    MR HEAD ANGIO WO IV CONTRAST  9/9/2021    MR HEAD ANGIO WO IV CONTRAST 9/9/2021 [facility]    MR NECK ANGIO WO IV CONTRAST  9/9/2021    MR NECK ANGIO WO IV CONTRAST 9/9/2021 [facility]    OTHER SURGICAL HISTORY  06/10/2013    Endoscopic Control Of Gastric Bleeding    OTHER SURGICAL HISTORY  01/04/2019    Hysterectomy    OTHER SURGICAL HISTORY  01/22/2019    Ankle surgery    OTHER SURGICAL HISTORY  01/22/2019    Cornea transplantation    OTHER SURGICAL HISTORY  01/22/2019    Colostomy    OTHER SURGICAL HISTORY  01/22/2019    Knee replacement     Family History   Problem Relation Name Age of Onset    Bipolar disorder Mother      Depression Mother      Parkinsonism Mother      Other (Cardiac disorder) Father      Hypertension Father      Other (Glioblastoma) Daughter      Cancer Other Grandmother     Bipolar disorder Other Family       Social History     Tobacco Use    Smoking status: Former     Years: 40     Types: Cigarettes    Smokeless tobacco: Never   Vaping Use    Vaping Use: Never used   Substance Use Topics    Alcohol use: Yes     Alcohol/week: 7.0 standard drinks of alcohol     Types: 7 Shots of liquor per week    Drug use: Never       Physical Exam   ED Triage Vitals [01/13/24 0713]   Temp Heart Rate Resp BP   36.4 °C (97.5 °F) 65 16 163/84      SpO2 Temp Source Heart Rate Source Patient Position   -- Oral Monitor Sitting      BP Location FiO2 (%)     Right arm --       Physical Exam  General: Vital signs stable. Alert & oriented.  No acute distress. Well nourished. Well hydrated. GCS=15  Neuro: Cranial nerves grossly intact. No motor or sensory changes. Appropriate and equal sensation and strength bilaterally.  No focal findings identified.  HENMT: +small hematoma right parietal region. No abrasion, or ecchymosis. No Raccoon eyes, No Rogers sign. Facial bones and skull nontender, no step-offs or crepitus. Mucous membranes moist. No pharyngeal erythema, uvula midline, teeth intact. Trachea is midline. Hearing grossly intact. No meningeal signs, moves neck freely. No C-spine tenderness, normal head and neck range of motion.  Eyes: PERRL, EOMs intact and nonpainful. Conjunctiva clear with no redness.  Cardiac: Sinus rhythm.  Pulmonary: No respiratory distress, normal respiratory effort. No accessory muscle use. Chest wall nontender to palpation.  Abdominal: Soft and nontender to palpation in all quadrants.  Skin: Warm, and dry.  RUE: +small 2cm skin tear over dorsal wrist. No active bleeding or drainage.  No surrounding edema, no decreased range of motion of the wrist.  Appears very superficial.  No obvious foreign bodies.  Extremity neurovascular intact.  MSK: full range of motion of upper and lower extremities. No tenderness over the midline cervical, thoracic, or lumbar spine. No deformities. No gross dislocations. Extremities are  warm and well perfused, no cyanosis, no edema.  LLE: +area of ecchymosis and tenderness over the left anterior shin, skin is intact, no bleeding or wounds.  Able to flex extend the ankle, knee, and digits.  No significant edema, no erythema or warmth.  Neurovascular intact.  Psych: Appropriate mood and affect.  ED Course & MDM   Diagnoses as of 01/13/24 0948   Fall, initial encounter   Tear of skin of right wrist, initial encounter     Medical Decision Making  ED course / MDM     Summary:  Patient presented with a mechanical fall that occurred at home today.  Pain in the left shin, small skin tear on the right wrist, unsure if she hit her head.  No other complaints.  Vital signs are stable, patient is on 2 L oxygen at home, placed on 3 L nasal cannula by EMS and is saturating 99%.  Patient is nontoxic-appearing, alert and oriented.  On exam, small skin tear on the right wrist, no sutures required, just by nursing with bacitracin and Mepilex.  There is an area of ecchymosis over the anterior left shin, no wounds, no edema.  Able to range all 4 extremities.  Nontender over the abdomen, chest wall, and midline spine.  There is a small hematoma over the right parietal region.  X-rays and CT scans ordered.  CT scans of the head and C-spine show no acute process, no intracranial hemorrhage or skull fracture, no C-spine fracture or subluxation, degenerative changes, also note a partially imaged moderate right pleural effusion and left upper lobe groundglass opacity.  X-rays of the left ankle, left tib-fib, right wrist, and pelvis show no acute fracture or dislocation.  Chest x-ray shows no traumatic process, shows a small to moderate right pleural effusion with interstitial thickening suggestive of interstitial edema.  Patient did have a fall with rib fractures in November, had a chest x-ray done at that time which showed similar findings.  She has no chest pain or shortness of breath, no chest wall tenderness, no cough or  pneumonia symptoms, unlikely related to her current complaint.  Will follow-up with her PCP regarding these findings. Patient case discussed with ED attending Dr. Elias, who also saw and evaluated the patient. Results and differential were discussed in detail with the patient.  Patient and her family are in agreement with the plan for discharge with close outpatient follow-up.  Discussed wound care instructions and precautions. Patient was given strict return precautions, understands reasons to return to the ED. Also discussed supportive care instructions. I expressed the importance of outpatient follow up with their PCP. All questions were answered, patient expressed understanding and stated that they would comply.    Patient was advised to follow up with PCP or recommended provider in 2-3 days for another evaluation and exam. I advised patient and family/friend/caregiver/guardian to return or go to closest emergency room immediately if symptoms change, get worse, new symptoms develop prior to follow up. If there is no improvement in symptoms in the next 24 hours they are advised to return for further evaluation and exam. I also explained the plan and treatment course. Patient and family/friend/caregiver/guardian is in agreement with plan, treatment course, and follow up and states verbally that they will comply.    Impression:  1. See diagnosis    Plan: Homegoing. I discussed the differential, results, and discharge plan with the patient and family/friend/caregiver. I emphasized the importance of follow-up with the physician I referred them to in the timeframe recommended.  I explained reasons for the patient to return to the Emergency Department. They agreed that if they feel their condition is worsening or if they have any other concern they should call 911 immediately for further assistance. We also discussed medications that were prescribed including common side effects and interactions. The patient was  advised to abstain from driving, operating heavy machinery, or making significant decisions while taking medications such as opiates and muscle relaxers that may impair this. I gave the patient an opportunity to ask all questions they had and answered all of them accordingly. They understand return precautions and discharge instructions. The patient and family/friend/caregiver expressed understanding verbally and that they would comply.       Disposition: Discharge    Patient seen and discussed with Dr. Elias    This note has been transcribed using voice recognition and may contain grammatical errors, misplaced words, incorrect words, incorrect phrases or other errors.   Procedure  Procedures     Adelina Pemberton PA-C  01/13/24 0949

## 2024-01-15 ENCOUNTER — TELEPHONE (OUTPATIENT)
Dept: PRIMARY CARE | Facility: CLINIC | Age: 89
End: 2024-01-15
Payer: MEDICARE

## 2024-01-15 NOTE — TELEPHONE ENCOUNTER
FYI: She had a fall and went to [facility] no fractures and she is at home resting but hasn't put her compression stockings on just yet because her ankle is still really swollen

## 2024-01-29 ENCOUNTER — OFFICE VISIT (OUTPATIENT)
Dept: CARDIOLOGY | Facility: CLINIC | Age: 89
End: 2024-01-29
Payer: MEDICARE

## 2024-01-29 VITALS
HEART RATE: 67 BPM | HEIGHT: 66 IN | DIASTOLIC BLOOD PRESSURE: 74 MMHG | OXYGEN SATURATION: 93 % | WEIGHT: 138 LBS | BODY MASS INDEX: 22.18 KG/M2 | SYSTOLIC BLOOD PRESSURE: 148 MMHG

## 2024-01-29 DIAGNOSIS — I35.0 MILD AORTIC STENOSIS: ICD-10-CM

## 2024-01-29 DIAGNOSIS — I48.19 ATRIAL FIBRILLATION, PERSISTENT (MULTI): Primary | ICD-10-CM

## 2024-01-29 DIAGNOSIS — Z95.818 PRESENCE OF WATCHMAN LEFT ATRIAL APPENDAGE CLOSURE DEVICE: ICD-10-CM

## 2024-01-29 DIAGNOSIS — E78.5 HYPERLIPIDEMIA, UNSPECIFIED HYPERLIPIDEMIA TYPE: ICD-10-CM

## 2024-01-29 DIAGNOSIS — I50.30 HEART FAILURE WITH PRESERVED LEFT VENTRICULAR FUNCTION (HFPEF) (MULTI): ICD-10-CM

## 2024-01-29 DIAGNOSIS — I48.20 CHRONIC ATRIAL FIBRILLATION (MULTI): ICD-10-CM

## 2024-01-29 DIAGNOSIS — I10 PRIMARY HYPERTENSION: ICD-10-CM

## 2024-01-29 PROCEDURE — 99214 OFFICE O/P EST MOD 30 MIN: CPT | Performed by: INTERNAL MEDICINE

## 2024-01-29 PROCEDURE — 1036F TOBACCO NON-USER: CPT | Performed by: INTERNAL MEDICINE

## 2024-01-29 PROCEDURE — 3078F DIAST BP <80 MM HG: CPT | Performed by: INTERNAL MEDICINE

## 2024-01-29 PROCEDURE — 1159F MED LIST DOCD IN RCRD: CPT | Performed by: INTERNAL MEDICINE

## 2024-01-29 PROCEDURE — 1125F AMNT PAIN NOTED PAIN PRSNT: CPT | Performed by: INTERNAL MEDICINE

## 2024-01-29 PROCEDURE — 1157F ADVNC CARE PLAN IN RCRD: CPT | Performed by: INTERNAL MEDICINE

## 2024-01-29 PROCEDURE — 3077F SYST BP >= 140 MM HG: CPT | Performed by: INTERNAL MEDICINE

## 2024-01-29 RX ORDER — DIGOXIN 125 MCG
125 TABLET ORAL DAILY
Qty: 90 TABLET | Refills: 3 | Status: SHIPPED | OUTPATIENT
Start: 2024-01-29 | End: 2025-01-28

## 2024-01-29 RX ORDER — FUROSEMIDE 40 MG/1
40 TABLET ORAL 2 TIMES DAILY
Qty: 180 TABLET | Refills: 3 | Status: SHIPPED | OUTPATIENT
Start: 2024-01-29 | End: 2024-01-29 | Stop reason: SDUPTHER

## 2024-01-29 RX ORDER — CARBIDOPA AND LEVODOPA 25; 100 MG/1; MG/1
1 TABLET, EXTENDED RELEASE ORAL EVERY EVENING
COMMUNITY

## 2024-01-29 RX ORDER — FUROSEMIDE 40 MG/1
40 TABLET ORAL 3 TIMES DAILY
Qty: 270 TABLET | Refills: 3 | Status: SHIPPED | OUTPATIENT
Start: 2024-01-29 | End: 2025-01-28

## 2024-01-29 ASSESSMENT — ENCOUNTER SYMPTOMS
VOMITING: 0
DEPRESSION: 0
DYSURIA: 0
DIARRHEA: 0
FALLS: 0
CHILLS: 0
HEMATURIA: 0
OCCASIONAL FEELINGS OF UNSTEADINESS: 0
NAUSEA: 0
BLOATING: 0
WHEEZING: 0
HEMOPTYSIS: 0
MYALGIAS: 0
FEVER: 0
MEMORY LOSS: 0
ALTERED MENTAL STATUS: 0
COUGH: 0
HEADACHES: 0
LOSS OF SENSATION IN FEET: 1
CONSTIPATION: 0
ABDOMINAL PAIN: 0

## 2024-01-29 ASSESSMENT — PAIN SCALES - GENERAL: PAINLEVEL: 2

## 2024-01-29 NOTE — PROGRESS NOTES
Chief complaint:  Follow-up     HPI  87 yo WF w/ h/o parox -> pers AFIB s/p DCCV 11/21 + 2/22, s/p Watchman 9/22, HFpEF, pulm HTN, mild AS, cor calc on CT, athero of Ao, HTN, HLD, GERD/PUD, COPD, MARYANN (intol CPAP), anemia, Parkinsons, ?TIA 9/21 now here for cardiology f/u.   No chest pain. No dyspnea at rest. +BUSTAMANTE (mod exertion), mildly improved on Lasix, ?worse in AFIB. No orthopnea/PND. No palps. +occ LH on standing up. No syncope. +ch LE edema, improved on Lasix. No claudication. No cough. +occ fatigue, ?worse in AFIB. +long h/o rare-occ soft fall (imbalance).  WGT at home: 140 lbs (down ~10 lbs)   WGT at appts: 6/21- 189, 7/21- 177, 12/, 3/22- 175lbs; 6/22- 170lbs; 1/23- 162lbs; 7/23- 152lb; 1/24- 138  ECG 4/19: AFIB (106)  ECG 5/21: SR (73), PACs, QTc 445  ECG 7/21: SB (57), QTc 430  ECG 9/21: SR (68)  ECG 9/21: SB (55)  ECG 11/21: AFIB (92)  ECG 11/21: SR (60), PACs  ECG 2/22: AFIB/FL (89)  ECG 3/22: AFIB/FL (78)  ECG 4/22: AFIB (76)  ECG 4/22: SB (51), PAC  ECG 5/22: AFIB (94)  ECG 5/22: AFIB (61)  ECG 8/22: AFIB (62)  ECG 10/22: AFIB (65), nonsp ST changes  ECG 1/23: AFIB (57)  ECG 4/23: AFIB (58)  ECG 10/23: AFIB (54), nonsp ST-T changes  HM 5/22: %, HR  (avg 80), HR 31 at 430am, 80 pauses (long 4.2s at 1250am)  Echo 9/18: EF 70%, DD, mils AS, mid-mod TR, PASP 53, small PE  Echo 10/19: EF 65-70%, DD, mod-sev LAE, mild AS, PASP 43  Echo 5/21: EF 70-75%, DD, nl RV, sev LAE, mild AS, mild-mod TR, PASP 73, nl IVC  Echo 4/22: TDS, EF 65-70%, mod-sev LAE, mod AS (26/12/1.2), mod TR, PASP 73 [AS likely at most mild-mod based on numbers]  CXR 5/21: sm B pl eff  CXR 9/21: CM, rae vasc cielo  CXR 4/22: no acute abnl  CXR 5/22: no acute abnl  CXR 1/24: sm-mod R pleur eff, ?int edema  CT chest 8/12: mod cor calc, mod athero of Ao  CT Watchman 9/22: no thrombus, mod athero of Ao  CTA heart 11/22: mild vs mod cor calc, sev SANDY, small amount of contrast within device w/ ext to part thromb atrial appendage  most likely c/w incomplete endothel of the device, no evidence of braulio-device leak or thrombus  PFT 7/20: mild obst (no resp), no rest, nl DLCO  CT ab 9/18: no AAA  CT/MRI brain 9/21: no acute abnl  CT brain 8/22: no acute abnl  CT brain 1/23: no acute abnl  MRA brain 9/21: no sig stenosis  MRA neck 9/21: LICA 25-30%, AMANDA 20%     Review of Systems   Constitutional: Negative for chills, fever and malaise/fatigue.   HENT:  Negative for hearing loss.    Eyes:  Negative for visual disturbance.   Respiratory:  Negative for cough, hemoptysis and wheezing.    Skin:  Negative for rash.   Musculoskeletal:  Negative for falls and myalgias.   Gastrointestinal:  Negative for bloating, abdominal pain, constipation, diarrhea, dysphagia, nausea and vomiting.   Genitourinary:  Negative for dysuria and hematuria.   Neurological:  Negative for headaches.   Psychiatric/Behavioral:  Negative for altered mental status, depression and memory loss.         Social History     Tobacco Use    Smoking status: Former     Years: 40     Types: Cigarettes    Smokeless tobacco: Never   Substance Use Topics    Alcohol use: Yes     Alcohol/week: 7.0 standard drinks of alcohol     Types: 7 Shots of liquor per week        Family History   Problem Relation Name Age of Onset    Bipolar disorder Mother      Depression Mother      Parkinsonism Mother      Other (Cardiac disorder) Father      Hypertension Father      Other (Glioblastoma) Daughter      Cancer Other Grandmother     Bipolar disorder Other Family         Allergies   Allergen Reactions    Morphine Itching    Hydrochlorothiazide Rash        Current Outpatient Medications   Medication Instructions    amoxicillin (Amoxil) 500 mg capsule TAKE 4 CAPSULES BY MOUTH ONE HOUR PRIOR TO DENTAL TREATMENT    BABY ASPIRIN ORAL 81 mg, oral, Daily    biotin 5 mg capsule oral, Take as directed    budesonide EC (ENTOCORT EC) 6 mg, oral, Daily RT    busPIRone (BUSPAR) 15 mg, oral, 2 times daily     carbidopa-levodopa (Sinemet CR)  mg ER tablet 1 tablet, oral, Every evening, Do not crush, chew, or split.    carbidopa-levodopa (Sinemet)  mg tablet Take 2 tablets in the morning; and 2 tablets at noon , 1.5 evening.    cholecalciferol (Vitamin D3) 50 mcg (2,000 unit) capsule Take as directed    cyanocobalamin (VITAMIN B-12) 1,000 mcg, oral, Daily RT    dexAMETHasone (Decadron) 0.1 % ophthalmic solution INSTILL 1 DROP INTO BOTH EYES EVERY DAY AS DIRECTED    digoxin (LANOXIN) 125 mcg, oral, Daily, Take 1 tablet 5 days of the week.    DULoxetine (CYMBALTA) 30 mg, oral, 2 times daily    famotidine (Pepcid) 20 mg tablet 1 tablet, oral, Daily    ferrous gluconate (Fergon) 240 (27 Fe) MG tablet 1 tablet, oral, Daily    furosemide (Lasix) 40 mg tablet TAKE 1 TABLET TWICE DAILY TAKE ONE IN AM (ON AWAKENING) AND ONE AROUND NOON    Incruse Ellipta 62.5 mcg/actuation inhalation 1 puff, inhalation, Daily    LORazepam (ATIVAN) 0.5 mg, oral, Daily PRN    losartan (COZAAR) 50 mg, oral, Daily    melatonin 5 mg capsule 1 tablet, oral, Nightly    metoprolol succinate XL (TOPROL-XL) 12.5 mg, oral, Daily    metroNIDAZOLE (Metrogel) 0.75 % gel Apply daily for rosacea    mirtazapine (REMERON) 7.5 mg, oral, Nightly    oxygen (O2) gas therapy 2 Liters via NC with sleep    pantoprazole (PROTONIX) 40 mg, oral, Daily before breakfast    rivastigmine (Exelon) 4.6 mg/24 hour     simvastatin (ZOCOR) 20 mg, oral, Daily    traZODone (Desyrel) 50 mg tablet 1 tablet, oral, Nightly    turmeric root extract 500 mg tablet Take as directed    Xifaxan 550 mg, oral, 3 times daily         Vitals:    01/29/24 0931   BP: 155/75   Pulse: 67   SpO2: 93%      Vitals:    01/29/24 0948   BP: 148/74   Pulse:    SpO2:          Physical Exam  Constitutional:       Appearance: Normal appearance.   HENT:      Head: Normocephalic and atraumatic.      Nose: Nose normal.   Neck:      Vascular: No carotid bruit.   Cardiovascular:      Rate and Rhythm: Normal  rate. Rhythm irregular.      Heart sounds: Murmur heard.      Systolic murmur is present with a grade of 2/6.   Pulmonary:      Effort: Pulmonary effort is normal.      Breath sounds: Normal breath sounds.   Abdominal:      Palpations: Abdomen is soft.      Tenderness: There is no abdominal tenderness.   Musculoskeletal:      Right lower le+ Pitting Edema present.      Left lower le+ Pitting Edema present.   Skin:     General: Skin is warm and dry.   Neurological:      General: No focal deficit present.      Mental Status: She is alert.   Psychiatric:         Mood and Affect: Mood normal.         Judgment: Judgment normal.        Results/Data  10/23 Cr 0.91, K 3.8, Dig 1.93   HGB 11.7   Cr 0.97, K 4.8, ALT/AST nl, LDL 92, HDL 45, , Chol 176, HGB 12, , TSH 0.68, CRP <0.1, Dig 1.28  10/22 Cr 1.0, K 4.3   Cr 1.05, K 4.7, HGB 11,    Cr 1.06, K 4.4, LFT nl   Cr 1.01, K 3.8, Dig 1.55   Cr 0.82, K 3.8, Mg 2.4, LFT nl, HGB 10, , TPN neg,  -> 283   Cr 1.1, K 4.3, Mg 1.98   Cr 1.23, K 3.8, LFT nl, HGB 12.6, , TSH 0.69   LDL 76, HDL 64, TG 81, Chol 157, hgba1c 6.0, TPN neg,    Cr 1.04, K 5.1, Mg 2.5, LFT nl, HGB 9.9,    TPN 0.08,       LDL 86, HDL 62, , Chol 169       Assessment/Plan   87 yo WF w/ h/o parox -> pers AFIB s/p DCCV  + , s/p Watchman , HFpEF, pulm HTN, mild AS, cor calc on CT, athero of Ao, HTN, HLD, GERD/PUD, COPD, MARYANN (intol CPAP), anemia, Parkinsons, ?TIA . Doing well. Appears mildly decompensated.   Pulm HTN likely related to CHF as well. Can re-assess once compensated. Will remain conservative in this elderly patient.  BP high today; check at home and notify if high; then can increase Losartan (careful with occ orthostatic LH - however, may be due to Parkinsons).  -continue ASA 81 qd (with food)  -continue Metoprolol Succinate 25 qd  -continue Dig 0.125  qd  -continue Losartan 50 every day (can increase if needed for HTN)  -continue Furosemide 40-60 bid (she prefers not to increase due to polyuria)  -consider resume Geoffrey in future if needed and kidney allows  -continue Simva 20 qd  -low salt, high K/Mg diet  -f/u 6 months (earlier if needed)     Kuldip Huddleston MD

## 2024-01-29 NOTE — PATIENT INSTRUCTIONS
Goal BP <140/90    Can take extra Furosemide 1/2 or 1 as needed for swelling, short of breath or weight gain

## 2024-02-05 DIAGNOSIS — F51.01 PRIMARY INSOMNIA: Primary | ICD-10-CM

## 2024-02-05 RX ORDER — TRAZODONE HYDROCHLORIDE 50 MG/1
50 TABLET ORAL NIGHTLY
Qty: 90 TABLET | Refills: 3 | Status: SHIPPED | OUTPATIENT
Start: 2024-02-05

## 2024-02-15 DIAGNOSIS — R30.0 DYSURIA: Primary | ICD-10-CM

## 2024-02-17 ENCOUNTER — LAB (OUTPATIENT)
Dept: LAB | Facility: LAB | Age: 89
End: 2024-02-17
Payer: MEDICARE

## 2024-02-17 DIAGNOSIS — R30.0 DYSURIA: ICD-10-CM

## 2024-02-17 DIAGNOSIS — N30.00 ACUTE CYSTITIS WITHOUT HEMATURIA: Primary | ICD-10-CM

## 2024-02-17 LAB
APPEARANCE UR: CLEAR
BILIRUB UR STRIP.AUTO-MCNC: NEGATIVE MG/DL
COLOR UR: YELLOW
GLUCOSE UR STRIP.AUTO-MCNC: NEGATIVE MG/DL
HYALINE CASTS #/AREA URNS AUTO: ABNORMAL /LPF
KETONES UR STRIP.AUTO-MCNC: ABNORMAL MG/DL
LEUKOCYTE ESTERASE UR QL STRIP.AUTO: ABNORMAL
NITRITE UR QL STRIP.AUTO: NEGATIVE
PH UR STRIP.AUTO: 5 [PH]
PROT UR STRIP.AUTO-MCNC: ABNORMAL MG/DL
RBC # UR STRIP.AUTO: NEGATIVE /UL
RBC #/AREA URNS AUTO: ABNORMAL /HPF
SP GR UR STRIP.AUTO: 1.01
SQUAMOUS #/AREA URNS AUTO: ABNORMAL /HPF
TRANS CELLS #/AREA UR COMP ASSIST: ABNORMAL /HPF
UROBILINOGEN UR STRIP.AUTO-MCNC: <2 MG/DL
WBC #/AREA URNS AUTO: ABNORMAL /HPF

## 2024-02-17 PROCEDURE — 87086 URINE CULTURE/COLONY COUNT: CPT

## 2024-02-17 PROCEDURE — 87186 SC STD MICRODIL/AGAR DIL: CPT

## 2024-02-17 PROCEDURE — 81001 URINALYSIS AUTO W/SCOPE: CPT

## 2024-02-17 RX ORDER — NITROFURANTOIN 25; 75 MG/1; MG/1
100 CAPSULE ORAL 2 TIMES DAILY
Qty: 10 CAPSULE | Refills: 0 | Status: SHIPPED | OUTPATIENT
Start: 2024-02-17 | End: 2024-02-22

## 2024-02-20 LAB — BACTERIA UR CULT: ABNORMAL

## 2024-02-21 ENCOUNTER — OFFICE VISIT (OUTPATIENT)
Dept: ORTHOPEDIC SURGERY | Facility: HOSPITAL | Age: 89
End: 2024-02-21
Payer: MEDICARE

## 2024-02-21 ENCOUNTER — HOSPITAL ENCOUNTER (OUTPATIENT)
Dept: RADIOLOGY | Facility: HOSPITAL | Age: 89
Discharge: HOME | End: 2024-02-21
Payer: MEDICARE

## 2024-02-21 DIAGNOSIS — M25.511 CHRONIC RIGHT SHOULDER PAIN: ICD-10-CM

## 2024-02-21 DIAGNOSIS — M12.811 ROTATOR CUFF ARTHROPATHY OF RIGHT SHOULDER: Primary | ICD-10-CM

## 2024-02-21 DIAGNOSIS — G89.29 CHRONIC RIGHT SHOULDER PAIN: ICD-10-CM

## 2024-02-21 PROCEDURE — 99214 OFFICE O/P EST MOD 30 MIN: CPT | Performed by: FAMILY MEDICINE

## 2024-02-21 PROCEDURE — 1157F ADVNC CARE PLAN IN RCRD: CPT | Performed by: FAMILY MEDICINE

## 2024-02-21 PROCEDURE — 73030 X-RAY EXAM OF SHOULDER: CPT | Mod: RIGHT SIDE | Performed by: RADIOLOGY

## 2024-02-21 PROCEDURE — 2500000004 HC RX 250 GENERAL PHARMACY W/ HCPCS (ALT 636 FOR OP/ED): Performed by: FAMILY MEDICINE

## 2024-02-21 PROCEDURE — 20610 DRAIN/INJ JOINT/BURSA W/O US: CPT | Performed by: FAMILY MEDICINE

## 2024-02-21 PROCEDURE — 1036F TOBACCO NON-USER: CPT | Performed by: FAMILY MEDICINE

## 2024-02-21 PROCEDURE — 1159F MED LIST DOCD IN RCRD: CPT | Performed by: FAMILY MEDICINE

## 2024-02-21 PROCEDURE — 2500000005 HC RX 250 GENERAL PHARMACY W/O HCPCS: Performed by: FAMILY MEDICINE

## 2024-02-21 PROCEDURE — 73030 X-RAY EXAM OF SHOULDER: CPT | Mod: RT

## 2024-02-21 PROCEDURE — 1125F AMNT PAIN NOTED PAIN PRSNT: CPT | Performed by: FAMILY MEDICINE

## 2024-02-21 RX ORDER — METHYLPREDNISOLONE ACETATE 40 MG/ML
80 INJECTION, SUSPENSION INTRA-ARTICULAR; INTRALESIONAL; INTRAMUSCULAR; SOFT TISSUE
Status: COMPLETED | OUTPATIENT
Start: 2024-02-21 | End: 2024-02-21

## 2024-02-21 RX ORDER — ROPIVACAINE HYDROCHLORIDE 5 MG/ML
4 INJECTION, SOLUTION EPIDURAL; INFILTRATION; PERINEURAL
Status: COMPLETED | OUTPATIENT
Start: 2024-02-21 | End: 2024-02-21

## 2024-02-21 RX ORDER — LIDOCAINE HYDROCHLORIDE 10 MG/ML
4 INJECTION INFILTRATION; PERINEURAL
Status: COMPLETED | OUTPATIENT
Start: 2024-02-21 | End: 2024-02-21

## 2024-02-21 RX ADMIN — ROPIVACAINE HYDROCHLORIDE 4 ML: 5 INJECTION, SOLUTION EPIDURAL; INFILTRATION; PERINEURAL at 10:46

## 2024-02-21 RX ADMIN — METHYLPREDNISOLONE ACETATE 80 MG: 40 INJECTION, SUSPENSION INTRA-ARTICULAR; INTRALESIONAL; INTRAMUSCULAR; INTRASYNOVIAL; SOFT TISSUE at 10:46

## 2024-02-21 RX ADMIN — LIDOCAINE HYDROCHLORIDE 4 ML: 10 INJECTION, SOLUTION INFILTRATION; PERINEURAL at 10:46

## 2024-02-21 ASSESSMENT — PAIN SCALES - GENERAL: PAINLEVEL_OUTOF10: 5 - MODERATE PAIN

## 2024-02-21 ASSESSMENT — PAIN - FUNCTIONAL ASSESSMENT: PAIN_FUNCTIONAL_ASSESSMENT: 0-10

## 2024-02-21 NOTE — PROGRESS NOTES
Patient is here for right shoulder pain possible injection, had seen Dr. Hayes for injections in the past       Patient ID: Adelina Case is a 88 y.o. female.    L Inj/Asp: R subacromial bursa on 2/21/2024 10:46 AM  Indications: pain  Details: 21 G needle, posterior approach  Medications: 80 mg methylPREDNISolone acetate 40 mg/mL; 4 mL lidocaine 10 mg/mL (1 %); 4 mL ropivacaine  Outcome: tolerated well, no immediate complications  Procedure, treatment alternatives, risks and benefits explained, specific risks discussed. Consent was given by the patient. Immediately prior to procedure a time out was called to verify the correct patient, procedure, equipment, support staff and site/side marked as required. Patient was prepped and draped in the usual sterile fashion.       Sports Medicine Office Note    Today's Date:  02/21/2024     HPI: Adelina Case is a 88 y.o. retired female with a history of Parkinson's disease and long-standing spinal stenosis who presents today with her adult daughter for chronic right shoulder pain.  She had previously been receiving injections by Dr. Salamanca.  I have seen her in the past for chronic left hip pain with cortisone injection.     On 3/10/20, she presented for possible cortisone injection into her left hip upon referral by Dr. Sampson. She has been having left hip and groin pain for several weeks without direct injury or trauma. This is interfering with her exercise program for Parkinson's disease. She describes her pain in the groin and buttocks area. She is having trouble with ADLs. She has had lumbar epidural steroids in the past for spinal stenosis. She has had no injections at her left hip before. She has no problems with the right hip. We agreed to a diagnostic and hopefully therapeutic cortisone injection into the left hip. The patient tolerated this well. Activity modifications were reviewed. Patient will keep any scheduled follow-up with Dr. Sampson.     On  4/7/2020, she follows up by telephone regarding her left hip 6 weeks status post cortisone injection. She reports great relief for 2-3 days and then it wore off for a couple of days before giving additional improvement. Today, she reports she is 50â€“60 percent better than before the injection. She awakes with a small amount of pain but she is able to walk and work it out. She has been doing home exercises daily. She takes APAP 2-3 tablets per day. She definitely feels this is coming from her hip and not from her back. Overall she is happy with the improvement from the injection. She has an appointment with Dr. Sampson in May. We agreed that she responded well to the cortisone injection into the left hip. She understands this can be repeated every 3-4 months or later. Activity modifications were reviewed. She will keep any scheduled follow-up with Dr. Sampson. I am happy to see the patient as needed.     On 11/3/2020, she returns to the office with her adult daughter for follow-up of chronic left hip pain and is requesting repeat cortisone injection for analgesia. Her daughter is an orthopedic physicians assistant. Adelina reports the previous cortisone injection given on 4/7/2020 gave her great relief until 2 to 3 weeks ago when her pain gradually started to return. She got 6.5-7 months of great relief. She denies interval injury or trauma. Her pain is almost as bad as it was before the previous injection. She is requesting repeat injection for long-term analgesia. We agreed to repeat a cortisone injection into the left hip. She tolerated this well. They understand that this can be repeated every 3 to 4 months or later if needed. Activity modifications were reviewed.      On 10/7/2022, she returns for 2-year follow-up of chronic left hip pain and is requesting cortisone injection. She recently suffered a fall 1 month ago which exacerbated her pain. She did not come in due to the COVID pandemic. She has had no other  treatments.  We agreed to repeat a cortisone injection into the left hip. She tolerated this well. They understand that this can be repeated every 3 to 4 months or later if needed. Activity modifications were reviewed. She will keep any scheduled follow-up with Dr. Sampson.     Today, 2/21/2024, she returns to me for evaluation of a new complaint of chronic right shoulder pain.  She had previously been under the care of Dr. Roque Hayes and received serial cortisone injections in the subacromial space.  Most recent was 11/16/2023 and this gave her 2 months of great relief.  They thought it might be easier to get into see me and not have to wait so long.  They are happy with her previous injections into the left hip.  They deny interval injury or trauma.  She is requesting increased range of motion and decreased pain    She has no other complaints.    Physical Examination:     The RIGHT shoulder is without obvious signs of acute bony deformity, swelling, erythema or ecchymosis. There is no tenderness along the joint line. There is no tenderness at the AC joint. There is no tenderness at the SC joint. Active range of motion is very limited in all directions secondary to pain and mechanical blocking.  Passive range of motion is not much better and very similar. Impingement signs are positive with nears test, Ortega and crossover. Instability tests are negative with anterior and posterior drawer, sulcus, and apprehension with relocation test. Rotator cuff strength is significantly weak in comparison to the left. The neck and opposite shoulder are otherwise normal and stable.    Imaging:  Radiographs of the right shoulder obtained today were reviewed and revealed severe rotator cuff arthropathy with bone-on-bone humeral head acromial articulation.  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  There are no signs of acute fractures or dislocations.  The studies were reviewed by me personally in the office  today.      Procedure:  After consent was obtained, the RIGHT posterior shoulder was prepped in sterile fashion. The area was aspirated and Depo-Medrol 80 mg with lidocaine 4 mL & ropivacaine 4 mL were injected into the subacromial space without complications. The patient tolerated the procedure well and the area was cleaned and bandaged.    Problem List Items Addressed This Visit    None  Visit Diagnoses         Codes    Rotator cuff arthropathy of right shoulder    -  Primary M12.811    Chronic right shoulder pain     M25.511, G89.29    Relevant Orders    XR shoulder right 2+ views            Assessment and Plan:     We reviewed the exam and x-ray findings and discussed the conservative and surgical treatment options. We agreed to a subacromial cortisone injection at her right shoulder to help with chronic pain from severe rotator cuff arthropathy.  If this gives her no benefit we may want to switch to a glenohumeral joint injection.  She can always follow-up with Dr. Hayes for further surgical options if needed.  Activity modifications were reviewed.  I am happy to see her back when her pain returns.    **This note was dictated using Dragon speech recognition software and was not corrected for spelling or grammatical errors**.    Salvador Purcell MD  Sports Medicine Specialist  [facility] [place]

## 2024-02-23 ENCOUNTER — APPOINTMENT (OUTPATIENT)
Dept: ORTHOPEDIC SURGERY | Facility: HOSPITAL | Age: 89
End: 2024-02-23
Payer: MEDICARE

## 2024-02-29 NOTE — PROGRESS NOTES
Subjective   Patient ID: Adelina Case is a 88 y.o. female.    HPI  Presents today in follow-up she has not been seen for a while  Past medical history significant for  Parkinson's disease  Dementia perhaps related to the Parkinson's disease  Chronic lymphocytic colitis or collagenous colitis many issues with diarrhea she does see Dr. Sanchez routinely and does use budesonide  now worse again  will start Xifacin   Chronic peripheral edema  Gait instability  Atrial fibrillation is status post Watchman procedure  Recurrent UTI urinary frequency urgency initially was not sure if the Myrbetriq was helping but now does believe the Myrbetriq had been helping and would like to restart  Sleeping more than normal in the past few months   sometimes 12 hours at night time   Some am cognition issues sometimes with orientation as well  She does continue to drive locally  She continues to live independently though they do have caregivers looking at assisted living as well although at this point not sure she wants to do this  She has lost more weight she  She feels she is eating okay  Having more difficulty swallowing at times she had had a lot going on was going to do a barium swallow at 1 point but did not proceed  Her feet have been bothering her noted a little ulceration on the left great toe  Also a little ulceration on her back in the scapular area          Review of Systems    Objective   Physical Exam  Well developed very frail age appropriate no acute distress she has lost a significant amount of weight  HEENT exam otherwise is unremarkable she has a somewhat flat affect  Her lungs are clear to auscultation and percussion  Cardiovascular ill regular there is a 2/6 systolic murmur present  There is small skin lesion on the posterior right scapula near the shoulder looks to be more of just a pressure issue but not acutely infected  Her left great toe MTP very small skin lesion at the bunion as well and not  infected  Periphery is with 1+ edema though markedly improved and does have on support stockings      Assessment/Plan   1 Parkinson's disease pretty stable at this point she has had a pretty slow progression of her disease she follows routinely with neurology note some concerns that she has had some almost delusions could be related to the Parkinson's disease as well of the cognitive issues they have noted  2.  Cognitive impairment which really may be secondary to the Parkinson's she is still driving I really want her to do a drivers evaluation of placed an order for occupational therapy she is agreeable with this  3.  Hypertension her blood pressure is actually a little low she has had a lot of diarrhea as well would like her to check her blood pressure at home if she remains less than 110 systolic I am going to adjust medicines probably would decrease her losartan concerned and that she could become orthostatic dizzy she is already at huge increased risk of falls  4.  Foot pain small lesion they would like to see orthopedic referral to Dr. Ferguson  5.  Diarrhea collagenous colitis seen Dr. Sanchez on budesonide Xifaxan just added to regimen have not yet started  6.  Urinary frequency urgency we will restart Myrbetriq which we did feel was helpful in the past  #7 weight loss she has had significant weight loss we have to check some routine blood work today and will include thyroid functions as well  8 atrial fibrillation stable continue current regimen  9.  COPD stable  40 minutes were spent with patient of which greater than 50% was spent in counseling and coordination of care  This note was partially generated using the Dragon voice recognition system.  There may be some incorrect wording ,grammar, spelling or punctuation errors that were not corrected prior to committing the note to the medical record.

## 2024-03-04 DIAGNOSIS — K58.0 IRRITABLE BOWEL SYNDROME WITH DIARRHEA: Primary | ICD-10-CM

## 2024-03-05 ENCOUNTER — OFFICE VISIT (OUTPATIENT)
Dept: PRIMARY CARE | Facility: CLINIC | Age: 89
End: 2024-03-05
Payer: MEDICARE

## 2024-03-05 ENCOUNTER — LAB (OUTPATIENT)
Dept: LAB | Facility: LAB | Age: 89
End: 2024-03-05
Payer: MEDICARE

## 2024-03-05 VITALS
DIASTOLIC BLOOD PRESSURE: 68 MMHG | WEIGHT: 127 LBS | SYSTOLIC BLOOD PRESSURE: 106 MMHG | HEART RATE: 66 BPM | BODY MASS INDEX: 20.5 KG/M2

## 2024-03-05 DIAGNOSIS — I10 PRIMARY HYPERTENSION: ICD-10-CM

## 2024-03-05 DIAGNOSIS — I48.19 ATRIAL FIBRILLATION, PERSISTENT (MULTI): ICD-10-CM

## 2024-03-05 DIAGNOSIS — D53.9 ANEMIA ASSOCIATED WITH NUTRITIONAL DEFICIENCY: ICD-10-CM

## 2024-03-05 DIAGNOSIS — J43.8 OTHER EMPHYSEMA (MULTI): ICD-10-CM

## 2024-03-05 DIAGNOSIS — K52.832 LYMPHOCYTIC COLITIS: ICD-10-CM

## 2024-03-05 DIAGNOSIS — I48.20 CHRONIC ATRIAL FIBRILLATION (MULTI): ICD-10-CM

## 2024-03-05 DIAGNOSIS — M79.672 FOOT PAIN, LEFT: ICD-10-CM

## 2024-03-05 DIAGNOSIS — R53.83 OTHER FATIGUE: ICD-10-CM

## 2024-03-05 DIAGNOSIS — G20.A1 PARKINSON'S DISEASE WITHOUT DYSKINESIA OR FLUCTUATING MANIFESTATIONS (MULTI): ICD-10-CM

## 2024-03-05 DIAGNOSIS — R39.15 URINARY URGENCY: Primary | ICD-10-CM

## 2024-03-05 DIAGNOSIS — J44.9 CHRONIC OBSTRUCTIVE PULMONARY DISEASE, UNSPECIFIED COPD TYPE (MULTI): Primary | ICD-10-CM

## 2024-03-05 DIAGNOSIS — R32 URINARY INCONTINENCE IN FEMALE: ICD-10-CM

## 2024-03-05 DIAGNOSIS — R13.10 DYSPHAGIA, UNSPECIFIED TYPE: ICD-10-CM

## 2024-03-05 LAB
ALBUMIN SERPL BCP-MCNC: 3.8 G/DL (ref 3.4–5)
ALP SERPL-CCNC: 70 U/L (ref 33–136)
ALT SERPL W P-5'-P-CCNC: 6 U/L (ref 7–45)
ANION GAP SERPL CALC-SCNC: 12 MMOL/L (ref 10–20)
AST SERPL W P-5'-P-CCNC: 10 U/L (ref 9–39)
BILIRUB SERPL-MCNC: 1.1 MG/DL (ref 0–1.2)
BUN SERPL-MCNC: 45 MG/DL (ref 6–23)
CALCIUM SERPL-MCNC: 10.7 MG/DL (ref 8.6–10.6)
CHLORIDE SERPL-SCNC: 102 MMOL/L (ref 98–107)
CO2 SERPL-SCNC: 31 MMOL/L (ref 21–32)
CREAT SERPL-MCNC: 0.87 MG/DL (ref 0.5–1.05)
DIGOXIN SERPL-MCNC: 1.21 NG/ML (ref 0.8–?)
EGFRCR SERPLBLD CKD-EPI 2021: 65 ML/MIN/1.73M*2
GLUCOSE SERPL-MCNC: 93 MG/DL (ref 74–99)
POTASSIUM SERPL-SCNC: 4.2 MMOL/L (ref 3.5–5.3)
PROT SERPL-MCNC: 6.1 G/DL (ref 6.4–8.2)
SODIUM SERPL-SCNC: 141 MMOL/L (ref 136–145)
TSH SERPL-ACNC: 0.93 MIU/L (ref 0.44–3.98)

## 2024-03-05 PROCEDURE — 1157F ADVNC CARE PLAN IN RCRD: CPT | Performed by: INTERNAL MEDICINE

## 2024-03-05 PROCEDURE — 36415 COLL VENOUS BLD VENIPUNCTURE: CPT

## 2024-03-05 PROCEDURE — 80162 ASSAY OF DIGOXIN TOTAL: CPT

## 2024-03-05 PROCEDURE — 1125F AMNT PAIN NOTED PAIN PRSNT: CPT | Performed by: INTERNAL MEDICINE

## 2024-03-05 PROCEDURE — 84443 ASSAY THYROID STIM HORMONE: CPT

## 2024-03-05 PROCEDURE — 99214 OFFICE O/P EST MOD 30 MIN: CPT | Performed by: INTERNAL MEDICINE

## 2024-03-05 PROCEDURE — 1036F TOBACCO NON-USER: CPT | Performed by: INTERNAL MEDICINE

## 2024-03-05 PROCEDURE — 1159F MED LIST DOCD IN RCRD: CPT | Performed by: INTERNAL MEDICINE

## 2024-03-05 PROCEDURE — 80053 COMPREHEN METABOLIC PANEL: CPT

## 2024-03-05 PROCEDURE — 3074F SYST BP LT 130 MM HG: CPT | Performed by: INTERNAL MEDICINE

## 2024-03-05 PROCEDURE — 1160F RVW MEDS BY RX/DR IN RCRD: CPT | Performed by: INTERNAL MEDICINE

## 2024-03-05 PROCEDURE — 3078F DIAST BP <80 MM HG: CPT | Performed by: INTERNAL MEDICINE

## 2024-03-05 RX ORDER — MIRABEGRON 25 MG/1
25 TABLET, FILM COATED, EXTENDED RELEASE ORAL DAILY
Qty: 30 TABLET | Refills: 3 | Status: SHIPPED | OUTPATIENT
Start: 2024-03-05 | End: 2024-05-13

## 2024-03-05 RX ORDER — UMECLIDINIUM 62.5 UG/1
1 AEROSOL, POWDER ORAL DAILY
Qty: 90 EACH | Refills: 3 | Status: SHIPPED | OUTPATIENT
Start: 2024-03-05

## 2024-03-05 NOTE — PATIENT INSTRUCTIONS
You are doing okay overall.  I really do not want you to lose any more weight!  Please make sure you are eating frequently higher protein foods as well.  We are going to check some routine blood work today  I want you to check your blood pressure as it is slightly low today  If you are consistently less than 110 systolic I do want to make some medication adjustments and would probably decrease your losartan at that time.  Be sure to drink plenty of fluids so you do not get dehydrated with the diarrhea  I have placed referral to Dr. Ferguson for foot  Barium swallow test has been ordered  Myrbetriq will be restarted for increased urination  I would like you to do a 's evaluation as well

## 2024-03-05 NOTE — PROGRESS NOTES
Diarrhea, mental status change worse in the morning happens several times in the last 2 weeks, sore on left foot, urinary incontinence, sleeping alot

## 2024-03-18 ENCOUNTER — APPOINTMENT (OUTPATIENT)
Dept: RADIOLOGY | Facility: HOSPITAL | Age: 89
End: 2024-03-18
Payer: MEDICARE

## 2024-03-25 DIAGNOSIS — K52.832 LYMPHOCYTIC COLITIS: Primary | ICD-10-CM

## 2024-03-25 RX ORDER — BUDESONIDE 3 MG/1
9 CAPSULE, COATED PELLETS ORAL
Qty: 90 CAPSULE | Refills: 0 | Status: SHIPPED | OUTPATIENT
Start: 2024-03-25 | End: 2024-04-15

## 2024-03-26 ENCOUNTER — APPOINTMENT (OUTPATIENT)
Dept: ORTHOPEDIC SURGERY | Facility: CLINIC | Age: 89
End: 2024-03-26
Payer: MEDICARE

## 2024-03-26 ENCOUNTER — HOSPITAL ENCOUNTER (OUTPATIENT)
Dept: RADIOLOGY | Facility: HOSPITAL | Age: 89
Discharge: HOME | End: 2024-03-26
Payer: MEDICARE

## 2024-03-26 DIAGNOSIS — R13.10 DYSPHAGIA, UNSPECIFIED TYPE: ICD-10-CM

## 2024-03-26 PROCEDURE — 3430000001 HC RX 343 DIAGNOSTIC RADIOPHARMACEUTICALS: Performed by: INTERNAL MEDICINE

## 2024-03-26 PROCEDURE — 2500000001 HC RX 250 WO HCPCS SELF ADMINISTERED DRUGS (ALT 637 FOR MEDICARE OP): Performed by: INTERNAL MEDICINE

## 2024-03-26 PROCEDURE — 74230 X-RAY XM SWLNG FUNCJ C+: CPT

## 2024-03-26 PROCEDURE — 92611 MOTION FLUOROSCOPY/SWALLOW: CPT | Mod: GN

## 2024-03-26 PROCEDURE — 74230 X-RAY XM SWLNG FUNCJ C+: CPT | Performed by: RADIOLOGY

## 2024-03-26 RX ADMIN — BARIUM SULFATE 100 ML: 0.81 POWDER, FOR SUSPENSION ORAL at 13:44

## 2024-03-26 RX ADMIN — BARIUM SULFATE 10 ML: 400 PASTE ORAL at 13:45

## 2024-03-26 RX ADMIN — BARIUM SULFATE 50 ML: 400 SUSPENSION ORAL at 13:45

## 2024-03-26 NOTE — PROGRESS NOTES
"Speech-Language Pathology        Modified Barium Swallow Study     Patient Name: Adelina Case  MRN: [id number]  : 1935  Today's Date: 24          Recommendations:  SOFT/REGULAR DIET WITH THIN LIQUIDS (NO MIXED CONSISTENCIES)  UPRIGHT AT 90 DEGREES FOR ALL PO  MEDICATION WHOLE IN PUREE CARRIER  SLOW RATE AND SMALL BOLUS SIZE   RESWALLOW    Assessment/Impression:    Full detailed SLP/Radiologist Modified Barium Swallow study report can be found under Chart Review tab, Imaging tab and  titled \"[place] Modified Barium Swallow Study\"      Pt. presenting with oropharyngeal dysphagia characterized by reduced bolus formation resulting in premature pharyngeal entry. Thin and nectar liquids extended to piriforms prior to swallow. Transient laryngeal penetration viewed with nectar liquids x 20mL. Laryngeal penetration with nectar occurred with continuous cup drinking, which remained in upper laryngeal vestibule post swallow. Aspiration viewed with thin liquids consumed x 20 mL with cough elicited in response. Cough was ineffective at clearing thin liquid aspirate from trachea. Transient laryngeal penetration noted x1 only when cued for small bolus sizes with slow rate. Single episode of transient laryngeal penetration with thin liquids occurred when slightly larger bolus consumed. No aspiration viewed when small boluses thin liquid consumed with reduced rate. No airway entry viewed with honey liquid or puree/solid boluses during exam. Oral residue with solids noted, which cleared with spontaneous reswallow. Adequate pharyngeal clearance occurred with all boluses. A CP impression viewed in proximal esophagus which did not appear to create a significant obstruction to bolus flow. Results discussed with patient and daughter following exam. Swallowing precautions reviewed with need to consider modification of liquid consistencies in the event post prandial cough persists with increased frequency despite implementation " of compensatory swallowing strategies introduced this date.    Plan:  Treatment/Interventions:  Oral motor exercises, Patient/family education, Bolus trials, IMST  SLP Plan: Skilled SLP warranted  SLP Frequency: To be determined by treating SLP in outpatient setting  Duration: 30 days    Discussed POC: Patient, Nursing   Discussed Risks/Benefits: Yes  Patient/Caregiver Agreeable: Yes    Pain:   No pain reported      GOALS:  Pt. to tolerate least restrictive diet without pulmonary compromise    Education:   Pt. educated on results of MBS study, recommended diet and recommended safe swallow strategies

## 2024-03-30 DIAGNOSIS — F41.9 ANXIETY: ICD-10-CM

## 2024-03-30 RX ORDER — LORAZEPAM 0.5 MG/1
0.5 TABLET ORAL DAILY PRN
Qty: 10 TABLET | Refills: 0 | Status: SHIPPED | OUTPATIENT
Start: 2024-03-30 | End: 2024-05-01 | Stop reason: SDUPTHER

## 2024-04-04 ENCOUNTER — OFFICE VISIT (OUTPATIENT)
Dept: ORTHOPEDIC SURGERY | Facility: CLINIC | Age: 89
End: 2024-04-04
Payer: MEDICARE

## 2024-04-04 ENCOUNTER — HOSPITAL ENCOUNTER (OUTPATIENT)
Dept: RADIOLOGY | Facility: CLINIC | Age: 89
Discharge: HOME | End: 2024-04-04
Payer: MEDICARE

## 2024-04-04 DIAGNOSIS — K58.0 IRRITABLE BOWEL SYNDROME WITH DIARRHEA: ICD-10-CM

## 2024-04-04 DIAGNOSIS — M79.672 FOOT PAIN, BILATERAL: ICD-10-CM

## 2024-04-04 DIAGNOSIS — M20.5X1 CLAWTOE, ACQUIRED, RIGHT: Primary | ICD-10-CM

## 2024-04-04 DIAGNOSIS — M20.11 HALLUX VALGUS (ACQUIRED), RIGHT FOOT: ICD-10-CM

## 2024-04-04 DIAGNOSIS — M79.671 FOOT PAIN, BILATERAL: ICD-10-CM

## 2024-04-04 DIAGNOSIS — K52.9 CHRONIC DIARRHEA: ICD-10-CM

## 2024-04-04 PROCEDURE — 73630 X-RAY EXAM OF FOOT: CPT | Mod: 50

## 2024-04-04 PROCEDURE — 99213 OFFICE O/P EST LOW 20 MIN: CPT | Performed by: ORTHOPAEDIC SURGERY

## 2024-04-04 PROCEDURE — 1036F TOBACCO NON-USER: CPT | Performed by: ORTHOPAEDIC SURGERY

## 2024-04-04 PROCEDURE — 1159F MED LIST DOCD IN RCRD: CPT | Performed by: ORTHOPAEDIC SURGERY

## 2024-04-04 PROCEDURE — 1160F RVW MEDS BY RX/DR IN RCRD: CPT | Performed by: ORTHOPAEDIC SURGERY

## 2024-04-04 PROCEDURE — 73630 X-RAY EXAM OF FOOT: CPT | Mod: BILATERAL PROCEDURE | Performed by: STUDENT IN AN ORGANIZED HEALTH CARE EDUCATION/TRAINING PROGRAM

## 2024-04-04 PROCEDURE — 1157F ADVNC CARE PLAN IN RCRD: CPT | Performed by: ORTHOPAEDIC SURGERY

## 2024-04-04 NOTE — LETTER
April 4, 2024     Tori Conrad MD  [address] [place] [address]    Patient: Adelina Case   YOB: 1935   Date of Visit: 4/4/2024       Dear Dr. Tori Conrad MD:    Thank you for referring Adelina Case to me for evaluation. Below are my notes for this consultation.  If you have questions, please do not hesitate to call me. I look forward to following your patient along with you.       Sincerely,     Efrain Ferguson MD      CC: No Recipients  ______________________________________________________________________________________    This 88-year-old woman was seen with her daughter for right foot pain.  Patient notes a painful deformed right second toe (claw toe).  She notes the toe is painful because it rubs on both the top and bottom of her shoes and the great toe rubs against it medially.  The patient denies any neurologic symptoms in the lower extremity other than some tingling.    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    Review of systems:  A complete review of the patient's past medical history and review of 30 systems and all medications and allergies was performed. Please see adult medical record for details.    A Family history for genetic or familial inheritance issues and Social history including smoking history, alcohol  consumption and exercise activities was also reviewed and significant findings noted in the patients history of present illness.    Physical Exam:  The patient is well-nourished and well-developed and in no acute distress. The patient displayed normal mood and affect. The patient's pupils were equal, round sclerae are white. Patient's respirations appear to be regular and unlabored.    Examination of the patient's right foot and ankle revealed the following. The patient's gait and stance revealed mild pes planus and pronation.  There was marked edema of both lower legs left greater than right.  There was marked venous stasis changes in both legs but no open wounds.  There did not appear to be any other skin abnormalities or lymphangitis. There no erythema.    The second toe was in a fixed claw toe deformity without skin breakdown.  There was tenderness to palpation over the dorsum of the PIP joint and tip of the toe.  There was hallux valgus deformity with the great toe impinging on the second toe.  There was mild hammering of the lesser toes.    Ankle range of motion was full. Subtalar motion was full and midtarsal motion was full. The Silfverskiold test was positive.   The neurovascular examination was intact. The neurological exam including motor and sensory exam was performed. The vascular examination including palpation of pulses and capillary refill of the foot was performed and determined to be intact.    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  The right foot demonstrates hallux valgus and a second toe fixed claw toe deformity.    Impression & Plan:   It is my impression this patient has a symptomatic second claw toe deformity.  Unfortunately she would be at significant risk for any surgical intervention.  Therefore I would recommend continued  nonoperative care.  I have show the patient and her daughter appropriate orthotic devices for the toes.  I provided them with information as to where to obtain the orthotic devices.    I would be delighted to see the patient back the future should  they  have further problems.    Note dictated with [employer] software.  Completed without full type editing and sent to avoid delay.

## 2024-04-04 NOTE — PROGRESS NOTES
This 88-year-old woman was seen with her daughter for right foot pain.  Patient notes a painful deformed right second toe (claw toe).  She notes the toe is painful because it rubs on both the top and bottom of her shoes and the great toe rubs against it medially.  The patient denies any neurologic symptoms in the lower extremity other than some tingling.    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    Review of systems:  A complete review of the patient's past medical history and review of 30 systems and all medications and allergies was performed. Please see adult medical record for details.    A Family history for genetic or familial inheritance issues and Social history including smoking history, alcohol consumption and exercise activities was also reviewed and significant findings noted in the patients history of present illness.    Physical Exam:  The patient is well-nourished and well-developed and in no acute distress. The patient displayed normal mood and affect. The patient's pupils were equal, round sclerae are white. Patient's respirations appear to be regular and unlabored.    Examination of the patient's right foot and ankle revealed the following. The patient's gait and stance revealed mild pes planus and pronation.  There was marked edema of both lower legs left  greater than right.  There was marked venous stasis changes in both legs but no open wounds.  There did not appear to be any other skin abnormalities or lymphangitis. There no erythema.    The second toe was in a fixed claw toe deformity without skin breakdown.  There was tenderness to palpation over the dorsum of the PIP joint and tip of the toe.  There was hallux valgus deformity with the great toe impinging on the second toe.  There was mild hammering of the lesser toes.    Ankle range of motion was full. Subtalar motion was full and midtarsal motion was full. The Silfverskiold test was positive.   The neurovascular examination was intact. The neurological exam including motor and sensory exam was performed. The vascular examination including palpation of pulses and capillary refill of the foot was performed and determined to be intact.    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  The right foot demonstrates hallux valgus and a second toe fixed claw toe deformity.    Impression & Plan:   It is my impression this patient has a symptomatic second claw toe deformity.  Unfortunately she would be at significant risk for any surgical intervention.  Therefore I would recommend continued nonoperative care.  I have show the patient and her daughter appropriate orthotic devices for the toes.  I provided them with information as to where to obtain the orthotic devices.    I would be delighted to see the patient back the future should  they  have further problems.    Note dictated with [employer] software.  Completed without full type editing and sent to avoid delay.

## 2024-04-08 ENCOUNTER — APPOINTMENT (OUTPATIENT)
Dept: ORTHOPEDIC SURGERY | Facility: CLINIC | Age: 89
End: 2024-04-08
Payer: MEDICARE

## 2024-04-10 DIAGNOSIS — F41.9 ANXIETY: Primary | ICD-10-CM

## 2024-04-10 RX ORDER — BUSPIRONE HYDROCHLORIDE 15 MG/1
15 TABLET ORAL 2 TIMES DAILY
Qty: 180 TABLET | Refills: 3 | Status: SHIPPED | OUTPATIENT
Start: 2024-04-10

## 2024-04-13 DIAGNOSIS — K52.832 LYMPHOCYTIC COLITIS: ICD-10-CM

## 2024-04-15 DIAGNOSIS — F41.9 ANXIETY: ICD-10-CM

## 2024-04-15 RX ORDER — LORAZEPAM 0.5 MG/1
0.5 TABLET ORAL DAILY PRN
Qty: 10 TABLET | Refills: 0 | OUTPATIENT
Start: 2024-04-15 | End: 2024-04-25

## 2024-04-15 RX ORDER — BUDESONIDE 3 MG/1
6 CAPSULE, COATED PELLETS ORAL DAILY
Qty: 180 CAPSULE | Refills: 1 | Status: SHIPPED | OUTPATIENT
Start: 2024-04-15 | End: 2024-04-23

## 2024-04-22 DIAGNOSIS — K52.832 LYMPHOCYTIC COLITIS: ICD-10-CM

## 2024-04-23 RX ORDER — BUDESONIDE 3 MG/1
9 CAPSULE, COATED PELLETS ORAL DAILY
Qty: 270 CAPSULE | Refills: 0 | Status: SHIPPED | OUTPATIENT
Start: 2024-04-23 | End: 2024-05-03 | Stop reason: SDUPTHER

## 2024-04-29 ENCOUNTER — TELEPHONE (OUTPATIENT)
Dept: GASTROENTEROLOGY | Facility: CLINIC | Age: 89
End: 2024-04-29
Payer: MEDICARE

## 2024-05-01 DIAGNOSIS — F41.9 ANXIETY: ICD-10-CM

## 2024-05-01 RX ORDER — LORAZEPAM 0.5 MG/1
0.5 TABLET ORAL DAILY PRN
Qty: 10 TABLET | Refills: 0 | Status: SHIPPED | OUTPATIENT
Start: 2024-05-01 | End: 2024-06-04 | Stop reason: SDUPTHER

## 2024-05-03 DIAGNOSIS — F41.9 ANXIETY: ICD-10-CM

## 2024-05-03 DIAGNOSIS — K52.832 LYMPHOCYTIC COLITIS: ICD-10-CM

## 2024-05-04 RX ORDER — BUDESONIDE 3 MG/1
9 CAPSULE, COATED PELLETS ORAL DAILY
Qty: 270 CAPSULE | Refills: 0 | Status: SHIPPED | OUTPATIENT
Start: 2024-05-04

## 2024-05-13 DIAGNOSIS — R39.15 URINARY URGENCY: ICD-10-CM

## 2024-05-13 RX ORDER — MIRABEGRON 25 MG/1
25 TABLET, FILM COATED, EXTENDED RELEASE ORAL DAILY
Qty: 30 TABLET | Refills: 3 | Status: SHIPPED | OUTPATIENT
Start: 2024-05-13

## 2024-05-29 DIAGNOSIS — K21.9 GASTROESOPHAGEAL REFLUX DISEASE, UNSPECIFIED WHETHER ESOPHAGITIS PRESENT: ICD-10-CM

## 2024-05-29 DIAGNOSIS — F41.9 ANXIETY: Primary | ICD-10-CM

## 2024-05-29 RX ORDER — DULOXETIN HYDROCHLORIDE 30 MG/1
30 CAPSULE, DELAYED RELEASE ORAL 2 TIMES DAILY
Qty: 180 CAPSULE | Refills: 3 | Status: SHIPPED | OUTPATIENT
Start: 2024-05-29 | End: 2025-05-24

## 2024-05-29 RX ORDER — PANTOPRAZOLE SODIUM 40 MG/1
40 TABLET, DELAYED RELEASE ORAL
Qty: 90 TABLET | Refills: 2 | Status: SHIPPED | OUTPATIENT
Start: 2024-05-29

## 2024-06-04 ENCOUNTER — TELEPHONE (OUTPATIENT)
Dept: PRIMARY CARE | Facility: CLINIC | Age: 89
End: 2024-06-04
Payer: MEDICARE

## 2024-06-04 DIAGNOSIS — F41.9 ANXIETY: ICD-10-CM

## 2024-06-04 DIAGNOSIS — S91.309A WOUND OF FOOT: ICD-10-CM

## 2024-06-04 RX ORDER — LORAZEPAM 0.5 MG/1
0.5 TABLET ORAL DAILY PRN
Qty: 10 TABLET | Refills: 0 | Status: SHIPPED | OUTPATIENT
Start: 2024-06-04 | End: 2024-06-14

## 2024-06-04 NOTE — TELEPHONE ENCOUNTER
She has a wound on her knuckle great toe it was oozing some serous fluid last night and is somewhat painful but she has had this for some time says you have seen it before. Would you want to see her now again for it or send her to wound care? Barbara said it doesn't;t really look infected but it also hasn't hurt until now.

## 2024-06-11 ENCOUNTER — OFFICE VISIT (OUTPATIENT)
Dept: WOUND CARE | Facility: CLINIC | Age: 89
End: 2024-06-11
Payer: MEDICARE

## 2024-06-11 PROCEDURE — 99213 OFFICE O/P EST LOW 20 MIN: CPT | Mod: 25

## 2024-06-11 PROCEDURE — 11042 DBRDMT SUBQ TIS 1ST 20SQCM/<: CPT

## 2024-06-17 ENCOUNTER — LAB (OUTPATIENT)
Dept: LAB | Facility: LAB | Age: 89
End: 2024-06-17
Payer: MEDICARE

## 2024-06-17 ENCOUNTER — TELEPHONE (OUTPATIENT)
Dept: PRIMARY CARE | Facility: CLINIC | Age: 89
End: 2024-06-17

## 2024-06-17 DIAGNOSIS — R40.0 SOMNOLENCE: ICD-10-CM

## 2024-06-17 DIAGNOSIS — E83.52 HYPERCALCEMIA: ICD-10-CM

## 2024-06-17 DIAGNOSIS — R35.0 URINARY FREQUENCY: ICD-10-CM

## 2024-06-17 DIAGNOSIS — R35.0 URINARY FREQUENCY: Primary | ICD-10-CM

## 2024-06-17 PROCEDURE — 82306 VITAMIN D 25 HYDROXY: CPT

## 2024-06-17 PROCEDURE — 81001 URINALYSIS AUTO W/SCOPE: CPT

## 2024-06-17 PROCEDURE — 36415 COLL VENOUS BLD VENIPUNCTURE: CPT

## 2024-06-17 PROCEDURE — 87086 URINE CULTURE/COLONY COUNT: CPT

## 2024-06-17 PROCEDURE — 80053 COMPREHEN METABOLIC PANEL: CPT

## 2024-06-17 NOTE — TELEPHONE ENCOUNTER
Pts daughter Barbara is calling in regards to possible UTI, States Adelina became a little more confused on Saturday and her urine frequency has gone up but that could be from upping her dosage on her lasix since she has been more swollen lately - SHASHANKP

## 2024-06-18 ENCOUNTER — APPOINTMENT (OUTPATIENT)
Dept: WOUND CARE | Facility: CLINIC | Age: 89
End: 2024-06-18
Payer: MEDICARE

## 2024-06-18 DIAGNOSIS — E83.52 HYPERCALCEMIA: Primary | ICD-10-CM

## 2024-06-18 LAB
25(OH)D3 SERPL-MCNC: 85 NG/ML (ref 30–100)
ALBUMIN SERPL BCP-MCNC: 4.1 G/DL (ref 3.4–5)
ALP SERPL-CCNC: 72 U/L (ref 33–136)
ALT SERPL W P-5'-P-CCNC: 15 U/L (ref 7–45)
ANION GAP SERPL CALC-SCNC: 12 MMOL/L (ref 10–20)
APPEARANCE UR: CLEAR
AST SERPL W P-5'-P-CCNC: 17 U/L (ref 9–39)
BILIRUB SERPL-MCNC: 1.2 MG/DL (ref 0–1.2)
BILIRUB UR STRIP.AUTO-MCNC: NEGATIVE MG/DL
BUN SERPL-MCNC: 33 MG/DL (ref 6–23)
CALCIUM SERPL-MCNC: 11.4 MG/DL (ref 8.6–10.6)
CHLORIDE SERPL-SCNC: 98 MMOL/L (ref 98–107)
CO2 SERPL-SCNC: 37 MMOL/L (ref 21–32)
COLOR UR: YELLOW
CREAT SERPL-MCNC: 0.83 MG/DL (ref 0.5–1.05)
EGFRCR SERPLBLD CKD-EPI 2021: 69 ML/MIN/1.73M*2
GLUCOSE SERPL-MCNC: 124 MG/DL (ref 74–99)
GLUCOSE UR STRIP.AUTO-MCNC: NORMAL MG/DL
HOLD SPECIMEN: NORMAL
HYALINE CASTS #/AREA URNS AUTO: ABNORMAL /LPF
KETONES UR STRIP.AUTO-MCNC: NEGATIVE MG/DL
LEUKOCYTE ESTERASE UR QL STRIP.AUTO: ABNORMAL
MUCOUS THREADS #/AREA URNS AUTO: ABNORMAL /LPF
NITRITE UR QL STRIP.AUTO: NEGATIVE
PH UR STRIP.AUTO: 6 [PH]
POTASSIUM SERPL-SCNC: 4.3 MMOL/L (ref 3.5–5.3)
PROT SERPL-MCNC: 6.3 G/DL (ref 6.4–8.2)
PROT UR STRIP.AUTO-MCNC: ABNORMAL MG/DL
RBC # UR STRIP.AUTO: NEGATIVE /UL
RBC #/AREA URNS AUTO: ABNORMAL /HPF
SODIUM SERPL-SCNC: 143 MMOL/L (ref 136–145)
SP GR UR STRIP.AUTO: 1.01
UROBILINOGEN UR STRIP.AUTO-MCNC: NORMAL MG/DL
WBC #/AREA URNS AUTO: ABNORMAL /HPF

## 2024-06-19 LAB — BACTERIA UR CULT: NORMAL

## 2024-06-25 ENCOUNTER — OFFICE VISIT (OUTPATIENT)
Dept: WOUND CARE | Facility: CLINIC | Age: 89
End: 2024-06-25
Payer: MEDICARE

## 2024-06-25 PROCEDURE — 99213 OFFICE O/P EST LOW 20 MIN: CPT

## 2024-07-16 ENCOUNTER — TELEPHONE (OUTPATIENT)
Dept: PRIMARY CARE | Facility: CLINIC | Age: 89
End: 2024-07-16
Payer: MEDICARE

## 2024-07-16 NOTE — TELEPHONE ENCOUNTER
She said she got a letter Dr. Cruz is scaling back his practice and won't be able to see as many patients so who else would you suggest?

## 2024-07-23 ENCOUNTER — TELEMEDICINE (OUTPATIENT)
Dept: CARDIOLOGY | Facility: CLINIC | Age: 89
End: 2024-07-23
Payer: MEDICARE

## 2024-07-23 DIAGNOSIS — I50.30 HEART FAILURE WITH PRESERVED LEFT VENTRICULAR FUNCTION (HFPEF) (MULTI): Primary | ICD-10-CM

## 2024-07-23 DIAGNOSIS — I48.19 ATRIAL FIBRILLATION, PERSISTENT (MULTI): ICD-10-CM

## 2024-07-23 PROCEDURE — 99214 OFFICE O/P EST MOD 30 MIN: CPT | Performed by: INTERNAL MEDICINE

## 2024-07-23 PROCEDURE — 1157F ADVNC CARE PLAN IN RCRD: CPT | Performed by: INTERNAL MEDICINE

## 2024-07-23 ASSESSMENT — ENCOUNTER SYMPTOMS
WHEEZING: 0
VOMITING: 0
FALLS: 0
ALTERED MENTAL STATUS: 0
DIARRHEA: 0
CONSTIPATION: 0
CHILLS: 0
DEPRESSION: 0
ABDOMINAL PAIN: 0
MEMORY LOSS: 0
FEVER: 0
NAUSEA: 0
DYSURIA: 0
HEMATURIA: 0
HEMOPTYSIS: 0
COUGH: 0
HEADACHES: 0
BLOATING: 0
MYALGIAS: 0

## 2024-07-23 NOTE — PROGRESS NOTES
Chief Complaint   Patient presents with    Atrial Fibrillation    Heart Failure       HPI  89 yo WF w/ h/o parox -> pers AFIB s/p DCCV 11/21 + 2/22, s/p Watchman 9/22, HFpEF, pulm HTN, mild AS, cor calc on CT, athero of Ao, HTN, HLD, GERD/PUD, COPD, MARYANN (intol CPAP), anemia, Parkinsons, ?TIA 9/21 now on video for cardiology f/u.   No chest pain. No dyspnea at rest. +BUSTAMANTE (mod exertion), mildly improved on Lasix, ?worse in AFIB. No orthopnea/PND. No palps. +occ LH on standing up. No syncope. +ch LE edema, improved on Lasix. No claudication. No cough. +occ fatigue, ?worse in AFIB. +long h/o rare-occ soft fall (imbalance).  WGT at home: 135 lbs (down ~5 lbs since last appt 6m ago)   WGT at appts: 6/21- 189, 7/21- 177, 12/, 3/22- 175lbs; 6/22- 170lbs; 1/23- 162lbs; 7/23- 152lb; 1/24- 138lbs  ECG 4/19: AFIB (106)  ECG 5/21: SR (73), PACs, QTc 445  ECG 7/21: SB (57), QTc 430  ECG 9/21: SR (68)  ECG 9/21: SB (55)  ECG 11/21: AFIB (92)  ECG 11/21: SR (60), PACs  ECG 2/22: AFIB/FL (89)  ECG 3/22: AFIB/FL (78)  ECG 4/22: AFIB (76)  ECG 4/22: SB (51), PAC  ECG 5/22: AFIB (94)  ECG 5/22: AFIB (61)  ECG 8/22: AFIB (62)  ECG 10/22: AFIB (65), nonsp ST changes  ECG 1/23: AFIB (57)  ECG 4/23: AFIB (58)  ECG 10/23: AFIB (54), nonsp ST-T changes  HM 5/22: %, HR  (avg 80), HR 31 at 430am, 80 pauses (long 4.2s at 1250am)  Echo 9/18: EF 70%, DD, mils AS, mid-mod TR, PASP 53, small PE  Echo 10/19: EF 65-70%, DD, mod-sev LAE, mild AS, PASP 43  Echo 5/21: EF 70-75%, DD, nl RV, sev LAE, mild AS, mild-mod TR, PASP 73, nl IVC  Echo 4/22: TDS, EF 65-70%, mod-sev LAE, mod AS (26/12/1.2), mod TR, PASP 73 [AS likely at most mild-mod based on numbers]  CXR 5/21: sm B pl eff  CXR 9/21: CM, rae vasc cielo  CXR 4/22: no acute abnl  CXR 5/22: no acute abnl  CXR 1/24: sm-mod R pleur eff, ?int edema  CT chest 8/12: mod cor calc, mod athero of Ao  CT Watchman 9/22: no thrombus, mod athero of Ao  CTA heart 11/22: mild vs mod cor calc, sev  SANDY, small amount of contrast within device w/ ext to part thromb atrial appendage most likely c/w incomplete endothel of the device, no evidence of braulio-device leak or thrombus  PFT 7/20: mild obst (no resp), no rest, nl DLCO  CT ab 9/18: no AAA  CT/MRI brain 9/21: no acute abnl  CT brain 8/22: no acute abnl  CT brain 1/23: no acute abnl  MRA brain 9/21: no sig stenosis  MRA neck 9/21: LICA 25-30%, AMANDA 20%     Review of Systems   Constitutional: Negative for chills, fever and malaise/fatigue.   HENT:  Negative for hearing loss.    Eyes:  Negative for visual disturbance.   Respiratory:  Negative for cough, hemoptysis and wheezing.    Skin:  Negative for rash.   Musculoskeletal:  Negative for falls and myalgias.   Gastrointestinal:  Negative for bloating, abdominal pain, constipation, diarrhea, dysphagia, nausea and vomiting.   Genitourinary:  Negative for dysuria and hematuria.   Neurological:  Negative for headaches.   Psychiatric/Behavioral:  Negative for altered mental status, depression and memory loss.       Social History     Tobacco Use    Smoking status: Former     Types: Cigarettes    Smokeless tobacco: Never   Substance Use Topics    Alcohol use: Yes     Alcohol/week: 7.0 standard drinks of alcohol     Types: 7 Shots of liquor per week      Family History   Problem Relation Name Age of Onset    Bipolar disorder Mother      Depression Mother      Parkinsonism Mother      Other (Cardiac disorder) Father      Hypertension Father      Other (Glioblastoma) Daughter      Cancer Other Grandmother     Bipolar disorder Other Family       Allergies   Allergen Reactions    Morphine Itching    Hydrochlorothiazide Rash      Current Outpatient Medications   Medication Instructions    amoxicillin (Amoxil) 500 mg capsule TAKE 4 CAPSULES BY MOUTH ONE HOUR PRIOR TO DENTAL TREATMENT    BABY ASPIRIN ORAL 81 mg, oral, Daily    biotin 5 mg capsule oral, Take as directed    budesonide EC (ENTOCORT EC) 9 mg, oral, Daily     busPIRone (BUSPAR) 15 mg, oral, 2 times daily    carbidopa-levodopa (Sinemet CR)  mg ER tablet 1 tablet, oral, Every evening, Do not crush, chew, or split.    carbidopa-levodopa (Sinemet)  mg tablet Take 2 tablets in the morning; and 2 tablets at noon , 1.5 evening.    cholecalciferol (Vitamin D3) 50 mcg (2,000 unit) capsule Take as directed    cyanocobalamin (VITAMIN B-12) 1,000 mcg, oral, Daily RT    dexAMETHasone (Decadron) 0.1 % ophthalmic solution INSTILL 1 DROP INTO BOTH EYES EVERY DAY AS DIRECTED    digoxin (LANOXIN) 125 mcg, oral, Daily, Take 1 tablet 5 days of the week.    DULoxetine (CYMBALTA) 30 mg, oral, 2 times daily    famotidine (Pepcid) 20 mg tablet 1 tablet, oral, Daily    ferrous gluconate (Fergon) 240 (27 Fe) MG tablet 1 tablet, oral, Daily    furosemide (LASIX) 40 mg, oral, 3 times daily    Incruse Ellipta 62.5 mcg, inhalation, Daily    LORazepam (ATIVAN) 0.5 mg, oral, Daily PRN    losartan (COZAAR) 50 mg, oral, Daily    melatonin 5 mg capsule 1 tablet, oral, Nightly    metoprolol succinate XL (TOPROL-XL) 12.5 mg, oral, Daily    metroNIDAZOLE (Metrogel) 0.75 % gel Apply daily for rosacea    mirabegron (MYBETRIQ) 25 mg, oral, Daily    mirtazapine (REMERON) 7.5 mg, oral, Nightly    oxygen (O2) gas therapy 2 Liters via NC with sleep    pantoprazole (PROTONIX) 40 mg, oral, Daily before breakfast    rivastigmine (Exelon) 4.6 mg/24 hour     simvastatin (ZOCOR) 20 mg, oral, Daily    traZODone (DESYREL) 50 mg, oral, Nightly    turmeric root extract 500 mg tablet Take as directed       There were no vitals filed for this visit.  BP at appt 3/5/24: 106/68    Physical Exam  Constitutional:       Appearance: Normal appearance.   Pulmonary:      Effort: Pulmonary effort is normal.   Musculoskeletal:      Right lower le+ Pitting Edema present.      Left lower le+ Pitting Edema present.   Neurological:      Mental Status: She is alert.   Psychiatric:         Mood and Affect: Mood normal.          Behavior: Behavior normal.         Thought Content: Thought content normal.        Results/Data  6/24 Cr 0.83, K 4.3  3/24 Cr 0.87, K 4.2, TSH 0.93, Dig 1.21  10/23 Cr 0.91, K 3.8, Dig 1.93  9/23 HGB 11.7  4/23 Cr 0.97, K 4.8, ALT/AST nl, LDL 92, HDL 45, , Chol 176, HGB 12, , TSH 0.68, CRP <0.1, Dig 1.28  10/22 Cr 1.0, K 4.3  9/22 Cr 1.05, K 4.7, HGB 11,   8/22 Cr 1.06, K 4.4, LFT nl  5/22 Cr 1.01, K 3.8, Dig 1.55  4/22 Cr 0.82, K 3.8, Mg 2.4, LFT nl, HGB 10, , TPN neg,  -> 283  12/21 Cr 1.1, K 4.3, Mg 1.98  11/21 Cr 1.23, K 3.8, LFT nl, HGB 12.6, , TSH 0.69  9/21 LDL 76, HDL 64, TG 81, Chol 157, hgba1c 6.0, TPN neg,   6/21 Cr 1.04, K 5.1, Mg 2.5, LFT nl, HGB 9.9,   5/21 TPN 0.08,   9/17   12/18 LDL 86, HDL 62, , Chol 169     Assessment/Plan   89 yo WF w/ h/o parox -> pers AFIB s/p DCCV 11/21 + 2/22, s/p Watchman 9/22, HFpEF, pulm HTN, mild AS, cor calc on CT, athero of Ao, HTN, HLD, GERD/PUD, COPD, MARYANN (intol CPAP), anemia, Parkinsons, ?TIA 9/21. Doing well. Appears stably mildly decompensated.   Pulm HTN likely related to CHF as well. Can re-assess once compensated. Will remain conservative in this elderly patient.  -continue ASA 81 qd (with food)  -continue Metoprolol Succinate 25 qd  -continue Dig 0.125 qd  -continue Losartan 50 every day (can increase if needed for HTN)  -continue Furosemide 40-60 bid (she prefers not to increase more due to polyuria)  -consider resume Geoffrey in future if needed and kidney allows  -continue Simva 20 qd  -low salt, high K/Mg diet  -f/u 6 months (earlier if needed)     Kuldip Huddleston MD

## 2024-08-14 DIAGNOSIS — F41.9 ANXIETY: ICD-10-CM

## 2024-08-14 RX ORDER — LORAZEPAM 0.5 MG/1
0.5 TABLET ORAL DAILY PRN
Qty: 10 TABLET | Refills: 0 | Status: SHIPPED | OUTPATIENT
Start: 2024-08-14 | End: 2024-08-24

## 2024-08-23 ENCOUNTER — TELEPHONE (OUTPATIENT)
Dept: PRIMARY CARE | Facility: CLINIC | Age: 89
End: 2024-08-23
Payer: MEDICARE

## 2024-08-23 DIAGNOSIS — R30.0 DYSURIA: Primary | ICD-10-CM

## 2024-08-23 NOTE — TELEPHONE ENCOUNTER
Pts daughter Liyah is calling in regards to Adelina being a little more confused than usual was seeing if they can have a urine ordered that way they can see if she has a UTI - SHASHANKP

## 2024-08-24 ENCOUNTER — LAB (OUTPATIENT)
Dept: LAB | Facility: LAB | Age: 89
End: 2024-08-24
Payer: MEDICARE

## 2024-08-24 DIAGNOSIS — R30.0 DYSURIA: ICD-10-CM

## 2024-08-24 LAB
APPEARANCE UR: CLEAR
BACTERIA #/AREA URNS AUTO: ABNORMAL /HPF
BILIRUB UR STRIP.AUTO-MCNC: NEGATIVE MG/DL
COLOR UR: ABNORMAL
GLUCOSE UR STRIP.AUTO-MCNC: NORMAL MG/DL
HYALINE CASTS #/AREA URNS AUTO: ABNORMAL /LPF
KETONES UR STRIP.AUTO-MCNC: NEGATIVE MG/DL
LEUKOCYTE ESTERASE UR QL STRIP.AUTO: ABNORMAL
NITRITE UR QL STRIP.AUTO: NEGATIVE
PH UR STRIP.AUTO: 6.5 [PH]
PROT UR STRIP.AUTO-MCNC: ABNORMAL MG/DL
RBC # UR STRIP.AUTO: NEGATIVE /UL
RBC #/AREA URNS AUTO: ABNORMAL /HPF
SP GR UR STRIP.AUTO: 1.01
UROBILINOGEN UR STRIP.AUTO-MCNC: NORMAL MG/DL
WBC #/AREA URNS AUTO: ABNORMAL /HPF

## 2024-08-24 PROCEDURE — 87086 URINE CULTURE/COLONY COUNT: CPT

## 2024-08-24 PROCEDURE — 81001 URINALYSIS AUTO W/SCOPE: CPT

## 2024-08-26 LAB — BACTERIA UR CULT: NO GROWTH

## 2024-08-28 NOTE — PROGRESS NOTES
Subjective   Patient ID: Adelina Case is a 88 y.o. female.    HPI  The patient presents today in follow-up she has not been seen for a while  She is accompanied today by Barbara   who is her caregiver and daughter      She does have a somewhat complicated past medical history  Parkinson's disease which has been somewhat progressive she does see neurology routinely at [facility]  Dr Klein she however stated she may want to simplify and make sure all of her physicians are in the same system  She has been having more cognitive issues and hallucinations.  Recently saw Dr. Ernie De La Paz had been added had not done well with rivastigmine patch developed diarrhea with this  She received Botox in her feet in the past as her toes curl under but did not really feel I did been helpful and in fact had falls the last couple times she has had the injection therefore opted not to repeat this when she saw a neurologist  They have hired more help at home and now has 4 to 6 hours a day of health Monday through Friday  They are involved with [place] at home so if needed would be able to use respite care at [facility]  She does sometimes get her medications mixed up but overall is pretty supervised    History of atrial fibrillation   status post Watchman procedure as she had had multiple falls as well  History of diastolic heart failure  Hypercholesteremia  Hypertension  Depression and anxiety  Gastroesophageal reflux  Insomnia  Depression and anxiety  History of collagenous colitis  Peripheral edema which has improved with weight loss    Review of Systems    Objective   Physical Exam  Well-developed very frail thin no acute distress  HEENT exam is unremarkable  Lungs are clear to auscultation percussion  Cardiovascular Irregular rate and rhythm  Periphery is 1+ edema bilateral unchanged  Gait is antalgic and she walks with a walker    Assessment/Plan   #1 Parkinson's disease which has been progressive she now is  limiting herself to the first floor of her home.  She does have increased care at home and daughter feels they are doing okay overall.  I agree she should not drive.  Is interested in perhaps simplifying her health care and would like everything in the same system I have placed referral for neurology through  as well as physical therapy  2.  Cognitive impairment and hallucinations felt secondary to Parkinson's Namenda has been added and she did not do well with rivastigmine secondary to diarrhea and did not want to rechallenge continue current regimen  3.  Atrial fibrillation status post Watchman procedure  4.  Hypertension adequate control  5.  Peripheral edema chronic in nature  6.  Health maintenance  When I see her next time organ a plan to do some blood work more than a physical  I have recommended COVID-19 vaccination when the new formulation is available  35 minutes were spent with patient of which greater than 50% was spent in counseling and coordination of care  This note was partially generated using the Dragon voice recognition system.  There may be some incorrect wording ,grammar, spelling or punctuation errors that were not corrected prior to committing the note to the medical record.

## 2024-08-29 DIAGNOSIS — F41.9 ANXIETY: ICD-10-CM

## 2024-08-29 DIAGNOSIS — F51.01 PRIMARY INSOMNIA: ICD-10-CM

## 2024-08-29 RX ORDER — MIRTAZAPINE 7.5 MG/1
7.5 TABLET, FILM COATED ORAL NIGHTLY
Qty: 90 TABLET | Refills: 3 | Status: SHIPPED | OUTPATIENT
Start: 2024-08-29

## 2024-09-03 ENCOUNTER — APPOINTMENT (OUTPATIENT)
Dept: PRIMARY CARE | Facility: CLINIC | Age: 89
End: 2024-09-03
Payer: MEDICARE

## 2024-09-03 VITALS — BODY MASS INDEX: 21.79 KG/M2 | SYSTOLIC BLOOD PRESSURE: 132 MMHG | WEIGHT: 135 LBS | DIASTOLIC BLOOD PRESSURE: 60 MMHG

## 2024-09-03 DIAGNOSIS — G20.A1 PARKINSON'S DISEASE WITHOUT DYSKINESIA, UNSPECIFIED WHETHER MANIFESTATIONS FLUCTUATE (MULTI): Primary | ICD-10-CM

## 2024-09-03 DIAGNOSIS — G31.84 MCI (MILD COGNITIVE IMPAIRMENT): ICD-10-CM

## 2024-09-03 DIAGNOSIS — I10 PRIMARY HYPERTENSION: ICD-10-CM

## 2024-09-03 DIAGNOSIS — I48.19 ATRIAL FIBRILLATION, PERSISTENT (MULTI): ICD-10-CM

## 2024-09-03 DIAGNOSIS — Z00.00 ROUTINE HEALTH MAINTENANCE: ICD-10-CM

## 2024-09-03 DIAGNOSIS — Z95.818 PRESENCE OF WATCHMAN LEFT ATRIAL APPENDAGE CLOSURE DEVICE: ICD-10-CM

## 2024-09-03 PROCEDURE — 3075F SYST BP GE 130 - 139MM HG: CPT | Performed by: INTERNAL MEDICINE

## 2024-09-03 PROCEDURE — 3078F DIAST BP <80 MM HG: CPT | Performed by: INTERNAL MEDICINE

## 2024-09-03 PROCEDURE — 1159F MED LIST DOCD IN RCRD: CPT | Performed by: INTERNAL MEDICINE

## 2024-09-03 PROCEDURE — 99214 OFFICE O/P EST MOD 30 MIN: CPT | Performed by: INTERNAL MEDICINE

## 2024-09-03 PROCEDURE — 1157F ADVNC CARE PLAN IN RCRD: CPT | Performed by: INTERNAL MEDICINE

## 2024-09-03 RX ORDER — MEMANTINE HYDROCHLORIDE 5 MG/1
5 TABLET ORAL 2 TIMES DAILY
COMMUNITY
Start: 2024-08-22 | End: 2025-08-22

## 2024-09-03 NOTE — PATIENT INSTRUCTIONS
I do think you are doing well overall.  I am very glad that you have reached out to get some more care at home as I think this is very important.  I agree that you should not drive.  I have placed a referral for neurology.(Dr Aguirre)  At this time however I do think you should continue with Dr. Klein until you see new neurologist as it may be some time  I have placed a referral for physical therapy as well  Lets do blood work before I see you the next time and we will do more of a physical exam at that time as well

## 2024-09-08 DIAGNOSIS — R39.15 URINARY URGENCY: ICD-10-CM

## 2024-09-08 RX ORDER — MIRABEGRON 25 MG/1
25 TABLET, FILM COATED, EXTENDED RELEASE ORAL DAILY
Qty: 30 TABLET | Refills: 3 | Status: SHIPPED | OUTPATIENT
Start: 2024-09-08

## 2024-09-12 DIAGNOSIS — E78.00 HYPERCHOLESTEROLEMIA: ICD-10-CM

## 2024-09-12 RX ORDER — SIMVASTATIN 20 MG/1
20 TABLET, FILM COATED ORAL DAILY
Qty: 90 TABLET | Refills: 3 | Status: SHIPPED | OUTPATIENT
Start: 2024-09-12

## 2024-09-19 ENCOUNTER — APPOINTMENT (OUTPATIENT)
Dept: NEUROLOGY | Facility: CLINIC | Age: 89
End: 2024-09-19
Payer: MEDICARE

## 2024-09-19 VITALS
SYSTOLIC BLOOD PRESSURE: 166 MMHG | BODY MASS INDEX: 21.69 KG/M2 | WEIGHT: 135 LBS | HEIGHT: 66 IN | DIASTOLIC BLOOD PRESSURE: 81 MMHG | HEART RATE: 57 BPM

## 2024-09-19 DIAGNOSIS — G20.A1 PARKINSON'S DISEASE WITHOUT DYSKINESIA, UNSPECIFIED WHETHER MANIFESTATIONS FLUCTUATE: ICD-10-CM

## 2024-09-19 PROCEDURE — 1157F ADVNC CARE PLAN IN RCRD: CPT | Performed by: PSYCHIATRY & NEUROLOGY

## 2024-09-19 PROCEDURE — 3079F DIAST BP 80-89 MM HG: CPT | Performed by: PSYCHIATRY & NEUROLOGY

## 2024-09-19 PROCEDURE — 3077F SYST BP >= 140 MM HG: CPT | Performed by: PSYCHIATRY & NEUROLOGY

## 2024-09-19 PROCEDURE — 99204 OFFICE O/P NEW MOD 45 MIN: CPT | Performed by: PSYCHIATRY & NEUROLOGY

## 2024-09-19 PROCEDURE — 1159F MED LIST DOCD IN RCRD: CPT | Performed by: PSYCHIATRY & NEUROLOGY

## 2024-09-19 PROCEDURE — 1036F TOBACCO NON-USER: CPT | Performed by: PSYCHIATRY & NEUROLOGY

## 2024-09-19 PROCEDURE — 1160F RVW MEDS BY RX/DR IN RCRD: CPT | Performed by: PSYCHIATRY & NEUROLOGY

## 2024-09-19 NOTE — PROGRESS NOTES
Consultation:   Subjective      Adelina Case is a 88 y.o. year old female is here for consult for PD.    Referred by Tori Conrad MD  Accompanied by family member today.     HPI  Seen and treated with Dr. Tong and Dr. Beauchamp.   Now followed by Dr. Klein but they want to switch providers due to location.   Diagnosed with PD in 2017 with left arm decreased when walking noticed by the doctor.   Started on Sinemet years ago, not sure what is helping with.  Daughter thinks it has helped with her left hand tremor.    Went to [facility] and PT. Loses balance , feels her legs are weak.   Has insomnia. Has bouts of diarrhea.   Hx of Lumbar radiculopathy. Afib , NOT on anticoagulation but had watchman.   No dizziness.  Has had delusions.  Not bothersome.   She gets botox in both legs and parotid for drooling.     On Namenda for memory issues.   Memory has declined.   Has some mild dyskinesia of left foot, not bothersome.   Waking up at night a lot.     Notes were from 8/22 Xeomin injected  total 300 units -Med left midline right   Tibialis posterior 150 50   Flexor digitorum brevis   Submandibular   Parotid 20 20   Current PD meds:  Sinemet 25-100mg 2 tabs/ 2 tabs/ 1.5 tabs  Sinemet 25-100mg ER     PCP Dr. Tori Tong- prescribing her sleep medications.   L Tremor is better with levodopa.    Review of Systems    Patient Active Problem List   Diagnosis    Anemia    Anxiety    Atrial fibrillation, persistent (Multi)    Chronic obstructive pulmonary disease (Multi)    CKD (chronic kidney disease), stage III (Multi)    Degenerative arthritis of knee, bilateral    Depression    Edema    Fasting hyperglycemia    Fatigue    Gastroesophageal reflux disease    Heart failure with preserved left ventricular function (HFpEF) (Multi)    HLD (hyperlipidemia)    Abnormal gait    Gait instability    Impairment of balance    Insomnia    Chronic diarrhea    Irritable bowel syndrome with diarrhea    Low back pain     Neurogenic claudication due to lumbar spinal stenosis    Lymphocytic colitis    MCI (mild cognitive impairment)    Parkinson's disease (Multi)    Peripheral neuropathy    Presence of Watchman left atrial appendage closure device    Primary hypertension    Restless legs syndrome    Rosacea    Dyspnea    Small intestinal bacterial overgrowth    Lumbar radicular pain    Spinal stenosis of lumbar region with radiculopathy    Urinary frequency    Urinary urgency    Vaginal atrophy    Nonscarring hair loss, unspecified    Mild aortic stenosis    Primary osteoarthritis of left hip    Urinary incontinence in female    Toe deformity, right    Neurogenic bladder    Hallux valgus (acquired), right foot    Clawtoe, acquired, right     Past Medical History:   Diagnosis Date    Acute sinusitis, unspecified 08/13/2013    Acute sinusitis    Adjustment disorder with depressed mood 05/01/2017    Grieving    Candidal esophagitis (Multi) 12/09/2020    Candida esophagitis    Familial hypercholesterolemia 12/09/2020    Familial hypercholesteremia    Lymphocytic colitis 08/28/2018    Lymphocytic colitis    Pain in unspecified joint     Joint pain    Personal history of other diseases of the circulatory system 05/16/2018    History of sinus bradycardia    Personal history of other diseases of the digestive system 08/28/2018    History of gastroesophageal reflux (GERD)    Personal history of other diseases of the digestive system     History of esophageal reflux    Radiculopathy, cervical region     Cervical radiculopathy    Radiculopathy, lumbosacral region     Lumbosacral radiculopathy at L5    Rheumatic diseases of endocardium, 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    Rheumatic disease of heart valve     Past Surgical History:   Procedure Laterality Date    MR HEAD ANGIO WO IV CONTRAST  9/9/2021    MR HEAD ANGIO WO IV CONTRAST 9/9/2021 [facility]    MR NECK ANGIO WO IV CONTRAST  9/9/2021    MR NECK ANGIO WO IV CONTRAST 9/9/2021 [facility]    OTHER SURGICAL HISTORY  06/10/2013    Endoscopic Control Of Gastric Bleeding    OTHER SURGICAL HISTORY  01/04/2019    Hysterectomy    OTHER SURGICAL HISTORY  01/22/2019    Ankle surgery    OTHER SURGICAL HISTORY  01/22/2019    Cornea transplantation    OTHER SURGICAL HISTORY  01/22/2019    Colostomy    OTHER SURGICAL HISTORY  01/22/2019    Knee replacement     Social History     Tobacco Use    Smoking status: Former     Types: Cigarettes    Smokeless tobacco: Never   Substance Use Topics    Alcohol use: Yes     Alcohol/week: 7.0 standard drinks of alcohol     Types: 7 Shots of liquor per week     family history includes Bipolar disorder in her mother and another family member; Cancer in an other family member; Cardiac disorder in her father; Depression in her mother; Glioblastoma in her daughter; Hypertension in her father; Parkinsonism in her mother.    Current Outpatient Medications:     amoxicillin (Amoxil) 500 mg capsule, TAKE 4 CAPSULES BY MOUTH ONE HOUR PRIOR TO DENTAL TREATMENT, Disp: , Rfl:     BABY ASPIRIN ORAL, Take 81 mg by mouth once daily., Disp: , Rfl:     Bifidobacterium infantis (ALIGN ORAL), Take by mouth., Disp: , Rfl:     biotin 5 mg capsule, Take by mouth. Take as directed, Disp: , Rfl:     budesonide EC (Entocort EC) 3 mg 24 hr capsule, Take 3 capsules (9 mg) by mouth once daily., Disp: 270 capsule, Rfl: 0    busPIRone (Buspar) 15 mg tablet, TAKE 1 TABLET BY MOUTH TWICE DAILY, Disp: 180 tablet, Rfl: 3    carbidopa-levodopa (Sinemet CR)  mg ER tablet, Take 1 tablet by mouth once daily in the evening. Do not crush, chew, or split., Disp: , Rfl:     carbidopa-levodopa (Sinemet)  mg tablet, Take 2 tablets in the morning; and 2 tablets at noon , 1.5 evening., Disp: , Rfl:     cholecalciferol (Vitamin D3) 50 mcg (2,000 unit) capsule, Take as directed, Disp: , Rfl:     cyanocobalamin (Vitamin B-12) 1,000 mcg tablet, Take 1 tablet (1,000 mcg) by mouth once daily., Disp: ,  Rfl:     dexAMETHasone (Decadron) 0.1 % ophthalmic solution, INSTILL 1 DROP INTO BOTH EYES EVERY DAY AS DIRECTED, Disp: , Rfl:     digoxin (Lanoxin) 125 MCG tablet, Take 1 tablet (125 mcg) by mouth once daily. Take 1 tablet 5 days of the week., Disp: 90 tablet, Rfl: 3    DULoxetine (Cymbalta) 30 mg DR capsule, Take 1 capsule (30 mg) by mouth 2 times a day., Disp: 180 capsule, Rfl: 3    famotidine (Pepcid) 20 mg tablet, Take 1 tablet (20 mg) by mouth once daily., Disp: , Rfl:     ferrous gluconate (Fergon) 240 (27 Fe) MG tablet, Take 1 tablet (27 mg of iron) by mouth once daily., Disp: , Rfl:     furosemide (Lasix) 40 mg tablet, Take 1 tablet (40 mg) by mouth 3 times a day., Disp: 270 tablet, Rfl: 3    Incruse Ellipta 62.5 mcg/actuation inhalation, USE 1 INHALATION BY MOUTH ONCE DAILY, Disp: 90 each, Rfl: 3    LORazepam (Ativan) 0.5 mg tablet, Take 1 tablet (0.5 mg) by mouth once daily as needed for anxiety for up to 10 days., Disp: 10 tablet, Rfl: 0    losartan (Cozaar) 50 mg tablet, Take 1 tablet (50 mg) by mouth once daily., Disp: 90 tablet, Rfl: 3    melatonin 5 mg capsule, Take 1 tablet by mouth once daily at bedtime., Disp: , Rfl:     memantine (Namenda) 5 mg tablet, Take 1 tablet (5 mg) by mouth twice a day., Disp: , Rfl:     metoprolol succinate XL (Toprol-XL) 25 mg 24 hr tablet, Take 0.5 tablets (12.5 mg) by mouth once daily., Disp: , Rfl:     metroNIDAZOLE (Metrogel) 0.75 % gel, Apply daily for rosacea, Disp: , Rfl:     mirtazapine (Remeron) 7.5 mg tablet, TAKE 1 TABLET BY MOUTH ONCE DAILY AT BEDTIME., Disp: 90 tablet, Rfl: 3    Myrbetriq 25 mg tablet extended release 24 hr 24 hr tablet, TAKE 1 TABLET BY MOUTH EVERY DAY, Disp: 30 tablet, Rfl: 3    oxygen (O2) gas therapy, 2 Liters via NC with sleep, Disp: , Rfl:     pantoprazole (ProtoNix) 40 mg EC tablet, Take 1 tablet (40 mg) by mouth once daily in the morning. Take before meals., Disp: 90 tablet, Rfl: 2    simvastatin (Zocor) 20 mg tablet, TAKE 1 TABLET  "BY MOUTH EVERY DAY, Disp: 90 tablet, Rfl: 3    traZODone (Desyrel) 50 mg tablet, Take 1 tablet (50 mg) by mouth once daily at bedtime., Disp: 90 tablet, Rfl: 3    turmeric root extract 500 mg tablet, Take as directed, Disp: , Rfl:   Allergies   Allergen Reactions    Morphine Itching    Hydrochlorothiazide Rash     /81   Pulse 57   Ht 1.676 m (5' 6\")   Wt 61.2 kg (135 lb)   BMI 21.79 kg/m²   Neurological Exam/Physical Exam:    Constitutional: General appearance: no acute distress. Pleasant.   Auscultation of Heart: Regular rate and rhythm, no murmurs, normal S1 and S2.   Carotid Arteries: no bruits  Peripheral Vascular Exam: has ankle and lower leg swelling b/l, wearing compression socks   Mental status: no distress, alert, interactive and cooperative. Affect is appropriate.  Confused easily.   Eyes: The ophthalmoscopic examination was normal.   Cranial nerve II: Visual fields full to confrontation.   Cranial nerves III, IV, and VI: Pupils round, equally reactive to light; EOMs intact. No nystagmus.   Cranial Nerve V: Facial sensation intact to LT bilaterally.   Cranial nerve VII: no facial droop  Cranial nerve VIII: Hearing is intact  Cranial nerves IX and X: Palate elevates symmetrically.   Cranial nerve XI: Shoulder shrug intact.   Cranial nerve XII: Tongue is midline.  Motor:  no tremor seen. Mild / subtle bradykinesia in legs. No bradykinesia in UE seen today. No rigidity seen today. Mild foot inversion seen on LLE.   No dyskinesia observed   Deep Tendon Reflexes: left biceps 2+ , right biceps 2+, left triceps 2+, right triceps 2+, left brachioradialis 2+, right brachioradialis 2+, left patella 2+, right patella 2+, left ankle jerk 1+, right ankle jerk 1+   Plantar Reflex: Toes downgoing to plantar stimulation on the left. Toes downgoing to plantar stimulation on the right.   Sensory Exam: Normal to vibratory sensation  Coordination:  no limb dystaxia   Gait:  uses walker.  Posture mild stooped. Mild " shuffling. Turns are enbloc       Labs:  CBC:   Lab Results   Component Value Date    WBC 5.5 09/30/2023    HGB 11.7 (L) 09/30/2023    HCT 35.9 (L) 09/30/2023     09/30/2023     BMP:   Lab Results   Component Value Date     06/17/2024    K 4.3 06/17/2024    CL 98 06/17/2024    CO2 37 (H) 06/17/2024    BUN 33 (H) 06/17/2024    CREATININE 0.83 06/17/2024    CALCIUM 11.4 (H) 06/17/2024    MG 2.40 04/23/2022    PHOS 2.7 10/31/2022     LFT:   Lab Results   Component Value Date    ALKPHOS 72 06/17/2024    BILITOT 1.2 06/17/2024    BILIDIR 0.2 04/20/2022    PROT 6.3 (L) 06/17/2024    ALBUMIN 4.1 06/17/2024    ALT 15 06/17/2024    AST 17 06/17/2024         Assessment/Plan   Problem List Items Addressed This Visit             ICD-10-CM    Parkinson's disease (Multi) G20.A1   I am not sure I would continue Botox for the legs .  See how Botox wearing off and legs are doing first.   Continue Myobloc for sialorrhea. 5000 U.    ( Avoid submandibular , in the past she had dry mouth with that)   Lots of medical comordities.   Continue PT.  High fall risk.   Would avoid ativan at her age and with confusion.   Memory and leg weakness/ deconditioning seem to be more issues than tremor/ stiffness/ bradykinesia.  NO increase in levodopa needed. We discussed possible decrease in this to 1.5 tabs TID.  Consider sleep med adjustments.

## 2024-09-19 NOTE — PATIENT INSTRUCTIONS
"It was a pleasure seeing you today.     Save the Date for [facility]' Parkinson's Boot Camp  Saturday, November 16th at the [facility] [place], [place]  [email or web address]      Please make a follow up appointment in 5-6 months.  You may also schedule a phone or virtual visit sooner on a Friday morning with me as needed before the next clinic appointment.     For any urgent issues or needing to speak to a medical assistant please call [phone], option 6 during our office hours Monday-Friday 8am-4pm, and leave a voicemail with your concern.  My office will try to reach back you as soon as possible within 24 (business) hours.  If you have an emergency please call 911 or visit a local urgent care or nearest emergency room.      Please understand that Procyrion is a useful communication tool for simple \"normal\" results or a refill request but I would not recommend using this tool for emergent or urgent issues or for conversations with me.  I am happy to ask my staff to rearrange a follow up visit or a virtual visit sooner than requested if appropriate for your care.    "

## 2024-09-19 NOTE — LETTER
2024    Re: Adelina Case ( 1935)      To Whom It May Concern:    Adelina Case is a patient who suffers from severe drooling (K11.7) that has worsened due to parkinsonism ( G20).  Her hypersalivation, or sialorrhea, puts him at high risk of choking and discomfort.  Her memory loss and cognitive status is too fragile to be on glycopyrollate.  There is no physical therapy for this condition.  She has been receiving injections with good benefit from another provider with good effect and would like to transfer care to me.      The treatment of choice for sialorrhea is botulinum toxin injections (CPT code 95741). In this procedure, Myobloc (CPT code ) produces a chemical denervation that reduces the flow of saliva from the salivary glands.  This should relieve the excess saliva for several months at a time.     With this procedure, the administration of botulinum toxin is incident to the physician's service of chemodenervation. The charge for the biological on the patient's bill represents the cost to the physician. In addition, the injections require the precise placement of a needle tip into the target muscles. Administration of this biological requires that only a physician provide this service, in part because of the clinical importance of precise anatomic placement of the injections. Consequently, injections of this type are far from routine, and appropriate charges for the procedure are indicated on the patient's bill.    If you need further information, please contact me.    Yours sincerely,          Maia Aguirre M.D.

## 2024-09-20 DIAGNOSIS — I10 PRIMARY HYPERTENSION: Primary | ICD-10-CM

## 2024-09-20 RX ORDER — METOPROLOL SUCCINATE 25 MG/1
12.5 TABLET, EXTENDED RELEASE ORAL DAILY
Qty: 45 TABLET | Refills: 3 | Status: SHIPPED | OUTPATIENT
Start: 2024-09-20

## 2024-10-10 DIAGNOSIS — F41.9 ANXIETY: ICD-10-CM

## 2024-10-10 RX ORDER — LORAZEPAM 0.5 MG/1
0.5 TABLET ORAL DAILY PRN
Qty: 10 TABLET | Refills: 0 | Status: SHIPPED | OUTPATIENT
Start: 2024-10-10 | End: 2024-10-20

## 2024-10-17 ENCOUNTER — TELEPHONE (OUTPATIENT)
Dept: GASTROENTEROLOGY | Facility: CLINIC | Age: 89
End: 2024-10-17
Payer: MEDICARE

## 2024-10-17 DIAGNOSIS — K58.0 IRRITABLE BOWEL SYNDROME WITH DIARRHEA: Primary | ICD-10-CM

## 2024-10-17 NOTE — TELEPHONE ENCOUNTER
Pt is requesting a prescription Xifaxan be sent to [facility] [address]. She c/o having diarrhea.

## 2024-10-22 DIAGNOSIS — K52.832 LYMPHOCYTIC COLITIS: ICD-10-CM

## 2024-10-22 RX ORDER — BUDESONIDE 3 MG/1
9 CAPSULE, COATED PELLETS ORAL DAILY
Qty: 270 CAPSULE | Refills: 0 | Status: SHIPPED | OUTPATIENT
Start: 2024-10-22

## 2024-12-02 ENCOUNTER — LAB (OUTPATIENT)
Dept: LAB | Facility: LAB | Age: 89
End: 2024-12-02
Payer: MEDICARE

## 2024-12-02 DIAGNOSIS — Z00.00 ROUTINE HEALTH MAINTENANCE: ICD-10-CM

## 2024-12-02 LAB
25(OH)D3 SERPL-MCNC: 61 NG/ML (ref 30–100)
ALBUMIN SERPL BCP-MCNC: 3.8 G/DL (ref 3.4–5)
ALP SERPL-CCNC: 87 U/L (ref 33–136)
ALT SERPL W P-5'-P-CCNC: 4 U/L (ref 7–45)
ANION GAP SERPL CALC-SCNC: 12 MMOL/L (ref 10–20)
APPEARANCE UR: CLEAR
AST SERPL W P-5'-P-CCNC: 10 U/L (ref 9–39)
BASOPHILS # BLD AUTO: 0.02 X10*3/UL (ref 0–0.1)
BASOPHILS NFR BLD AUTO: 0.3 %
BILIRUB SERPL-MCNC: 1 MG/DL (ref 0–1.2)
BILIRUB UR STRIP.AUTO-MCNC: NEGATIVE MG/DL
BUN SERPL-MCNC: 35 MG/DL (ref 6–23)
CALCIUM SERPL-MCNC: 10.9 MG/DL (ref 8.6–10.6)
CHLORIDE SERPL-SCNC: 103 MMOL/L (ref 98–107)
CHOLEST SERPL-MCNC: 170 MG/DL (ref 0–199)
CHOLESTEROL/HDL RATIO: 3.4
CO2 SERPL-SCNC: 35 MMOL/L (ref 21–32)
COLOR UR: ABNORMAL
CREAT SERPL-MCNC: 0.82 MG/DL (ref 0.5–1.05)
CRP SERPL-MCNC: <0.1 MG/DL
EGFRCR SERPLBLD CKD-EPI 2021: 69 ML/MIN/1.73M*2
EOSINOPHIL # BLD AUTO: 0.06 X10*3/UL (ref 0–0.4)
EOSINOPHIL NFR BLD AUTO: 0.9 %
ERYTHROCYTE [DISTWIDTH] IN BLOOD BY AUTOMATED COUNT: 13.2 % (ref 11.5–14.5)
EST. AVERAGE GLUCOSE BLD GHB EST-MCNC: 103 MG/DL
GLUCOSE SERPL-MCNC: 100 MG/DL (ref 74–99)
GLUCOSE UR STRIP.AUTO-MCNC: NORMAL MG/DL
HBA1C MFR BLD: 5.2 %
HCT VFR BLD AUTO: 40.8 % (ref 36–46)
HDLC SERPL-MCNC: 50 MG/DL
HGB BLD-MCNC: 12.9 G/DL (ref 12–16)
HYALINE CASTS #/AREA URNS AUTO: ABNORMAL /LPF
IMM GRANULOCYTES # BLD AUTO: 0.03 X10*3/UL (ref 0–0.5)
IMM GRANULOCYTES NFR BLD AUTO: 0.4 % (ref 0–0.9)
KETONES UR STRIP.AUTO-MCNC: NEGATIVE MG/DL
LDLC SERPL CALC-MCNC: 83 MG/DL
LEUKOCYTE ESTERASE UR QL STRIP.AUTO: ABNORMAL
LYMPHOCYTES # BLD AUTO: 1.08 X10*3/UL (ref 0.8–3)
LYMPHOCYTES NFR BLD AUTO: 16.1 %
MCH RBC QN AUTO: 33.1 PG (ref 26–34)
MCHC RBC AUTO-ENTMCNC: 31.6 G/DL (ref 32–36)
MCV RBC AUTO: 105 FL (ref 80–100)
MONOCYTES # BLD AUTO: 0.66 X10*3/UL (ref 0.05–0.8)
MONOCYTES NFR BLD AUTO: 9.9 %
MUCOUS THREADS #/AREA URNS AUTO: ABNORMAL /LPF
NEUTROPHILS # BLD AUTO: 4.85 X10*3/UL (ref 1.6–5.5)
NEUTROPHILS NFR BLD AUTO: 72.4 %
NITRITE UR QL STRIP.AUTO: NEGATIVE
NON HDL CHOLESTEROL: 120 MG/DL (ref 0–149)
NRBC BLD-RTO: 0 /100 WBCS (ref 0–0)
PH UR STRIP.AUTO: 5.5 [PH]
PLATELET # BLD AUTO: 218 X10*3/UL (ref 150–450)
POTASSIUM SERPL-SCNC: 4.7 MMOL/L (ref 3.5–5.3)
PROT SERPL-MCNC: 6.2 G/DL (ref 6.4–8.2)
PROT UR STRIP.AUTO-MCNC: ABNORMAL MG/DL
RBC # BLD AUTO: 3.9 X10*6/UL (ref 4–5.2)
RBC # UR STRIP.AUTO: NEGATIVE /UL
RBC #/AREA URNS AUTO: ABNORMAL /HPF
RENAL EPI CELLS #/AREA UR COMP ASSIST: ABNORMAL /HPF
SODIUM SERPL-SCNC: 145 MMOL/L (ref 136–145)
SP GR UR STRIP.AUTO: 1.01
SQUAMOUS #/AREA URNS AUTO: ABNORMAL /HPF
TRIGL SERPL-MCNC: 187 MG/DL (ref 0–149)
TSH SERPL-ACNC: 1.39 MIU/L (ref 0.44–3.98)
UROBILINOGEN UR STRIP.AUTO-MCNC: NORMAL MG/DL
VLDL: 37 MG/DL (ref 0–40)
WBC # BLD AUTO: 6.7 X10*3/UL (ref 4.4–11.3)
WBC #/AREA URNS AUTO: ABNORMAL /HPF

## 2024-12-02 PROCEDURE — 82306 VITAMIN D 25 HYDROXY: CPT

## 2024-12-02 PROCEDURE — 36415 COLL VENOUS BLD VENIPUNCTURE: CPT

## 2024-12-02 PROCEDURE — 83036 HEMOGLOBIN GLYCOSYLATED A1C: CPT

## 2024-12-02 PROCEDURE — 84443 ASSAY THYROID STIM HORMONE: CPT

## 2024-12-02 PROCEDURE — 86140 C-REACTIVE PROTEIN: CPT

## 2024-12-02 PROCEDURE — 85025 COMPLETE CBC W/AUTO DIFF WBC: CPT

## 2024-12-02 PROCEDURE — 81001 URINALYSIS AUTO W/SCOPE: CPT

## 2024-12-02 PROCEDURE — 80053 COMPREHEN METABOLIC PANEL: CPT

## 2024-12-02 PROCEDURE — 80061 LIPID PANEL: CPT

## 2024-12-03 DIAGNOSIS — F41.9 ANXIETY: ICD-10-CM

## 2024-12-03 RX ORDER — LORAZEPAM 0.5 MG/1
0.5 TABLET ORAL DAILY PRN
Qty: 10 TABLET | Refills: 0 | Status: SHIPPED | OUTPATIENT
Start: 2024-12-03 | End: 2024-12-13

## 2024-12-16 ENCOUNTER — APPOINTMENT (OUTPATIENT)
Dept: PRIMARY CARE | Facility: CLINIC | Age: 89
End: 2024-12-16
Payer: MEDICARE

## 2024-12-16 VITALS
BODY MASS INDEX: 23.32 KG/M2 | SYSTOLIC BLOOD PRESSURE: 124 MMHG | DIASTOLIC BLOOD PRESSURE: 64 MMHG | HEART RATE: 65 BPM | OXYGEN SATURATION: 90 % | WEIGHT: 140 LBS | HEIGHT: 65 IN

## 2024-12-16 DIAGNOSIS — I50.30 HEART FAILURE WITH PRESERVED LEFT VENTRICULAR FUNCTION (HFPEF): ICD-10-CM

## 2024-12-16 DIAGNOSIS — I48.19 ATRIAL FIBRILLATION, PERSISTENT (MULTI): Primary | ICD-10-CM

## 2024-12-16 DIAGNOSIS — F41.9 ANXIETY: ICD-10-CM

## 2024-12-16 DIAGNOSIS — Z95.818 PRESENCE OF WATCHMAN LEFT ATRIAL APPENDAGE CLOSURE DEVICE: ICD-10-CM

## 2024-12-16 DIAGNOSIS — I10 PRIMARY HYPERTENSION: ICD-10-CM

## 2024-12-16 DIAGNOSIS — G20.A1 PARKINSON'S DISEASE WITHOUT DYSKINESIA OR FLUCTUATING MANIFESTATIONS: ICD-10-CM

## 2024-12-16 PROBLEM — R73.01 FASTING HYPERGLYCEMIA: Status: RESOLVED | Noted: 2023-04-25 | Resolved: 2024-12-16

## 2024-12-16 PROBLEM — G25.81 RESTLESS LEGS SYNDROME: Status: RESOLVED | Noted: 2023-04-25 | Resolved: 2024-12-16

## 2024-12-16 PROBLEM — M16.12 PRIMARY OSTEOARTHRITIS OF LEFT HIP: Status: RESOLVED | Noted: 2023-09-07 | Resolved: 2024-12-16

## 2024-12-16 PROBLEM — R39.15 URINARY URGENCY: Status: RESOLVED | Noted: 2023-04-25 | Resolved: 2024-12-16

## 2024-12-16 PROBLEM — D64.9 ANEMIA: Status: RESOLVED | Noted: 2023-04-25 | Resolved: 2024-12-16

## 2024-12-16 ASSESSMENT — ACTIVITIES OF DAILY LIVING (ADL)
DOING_HOUSEWORK: NEEDS ASSISTANCE
GROCERY_SHOPPING: NEEDS ASSISTANCE
TAKING_MEDICATION: NEEDS ASSISTANCE
MANAGING_FINANCES: NEEDS ASSISTANCE

## 2024-12-16 ASSESSMENT — PATIENT HEALTH QUESTIONNAIRE - PHQ9
1. LITTLE INTEREST OR PLEASURE IN DOING THINGS: NOT AT ALL
2. FEELING DOWN, DEPRESSED OR HOPELESS: NOT AT ALL
SUM OF ALL RESPONSES TO PHQ9 QUESTIONS 1 AND 2: 0

## 2024-12-16 ASSESSMENT — ENCOUNTER SYMPTOMS
OCCASIONAL FEELINGS OF UNSTEADINESS: 1
DEPRESSION: 0
LOSS OF SENSATION IN FEET: 0

## 2024-12-16 NOTE — PROGRESS NOTES
Physical Exam    Name Adelina Case    Date of Service :12/16/2024      Adelina Case  89 y.o. is here for an annual physical exam  Past medical history significant for  Atrial fibrillation which has been persistent he did have a Watchman procedure performed in fall 2022 and now off anticoagulation which is good news as she has had multiple falls and had had bleeds including GI bleed she follows routinely with cardiology  Dr Kimball   Chronic kidney disease which has been stable  Sleep apnea  COPD uses nocturnal oxygen  Parkinson's disease diagnosed in 2017 had followed with with neurology through [facility] Dr. Klein  (tried Dr Aguirre  and decided to stay with Dr Klein)  She has definitely been having more delusions and having the same type of delusions feeling like she is constantly moving but needs to contact the movers  Namenda had been started she had been on  Frequent falls is going to PT  last fell on Sat  Delusions remain  the same   feels like she moves frequently etc   Started on Memantadine       Lumbar radiculopathy    Chronic peripheral edema she uses support stockings routinely  With diastolic heart failure Dr Huddleston her furosemide dose had recently been increased to 1-1/2 tablets twice daily and seems to feel like this is made a difference with her edema  Chronic kidney disease kidney functions have been stable  Hypertension  Diarrhea felt to be more collagenous colitis versus small bowel overgrowth she did feel better after she took the Xifaxan  Anxiety and depression  Low back pain spinal stenosis she does see Dr. Cruz from pain management pretty routinely  She is status post partial colectomy for diverticular disease she has she did have a temporary colostomy at one time.  She has had recurrent bowel obstruction as well none for a while  Urinary incontinence remains on Myrbetriq  Cognitive impairment has remained on Namenda  had been on rivastigmine patch in the past but she  had problems remembering to use it as well as there is a question whether it caused diarrhea but she has intermittent issues with diarrhea probably a combination of bacterial overgrowth and microscopic colitis  She now has some more help at home so might be able to do the patch with more consistency  Chronic insomnia current radiantly using trazodone 50 mg at night mirtazapine melatonin    Uses rare lorazepam is only given a few monthly but recommendations from neurologist are to avoid      Continues to live independently at this time  She has increased the amount of help she has she has somebody on Monday and Wednesday as well as Thursday and Friday each about 6 hours a day and then her daughter Barbara feels and most of the other time definitely seems to have more confusion at night  She is trying to remain active physically and mentally she is joining [employer] at home trying to participate in some activities would like to get there more  She does go to physical therapy routinely because of her increased risk of falls       Past Medical History:   Diagnosis Date    Acute sinusitis, unspecified 08/13/2013    Acute sinusitis    Adjustment disorder with depressed mood 05/01/2017    Kofi    Candidal esophagitis (Multi) 12/09/2020    Candida esophagitis    Familial hypercholesterolemia 12/09/2020    Familial hypercholesteremia    Lymphocytic colitis 08/28/2018    Lymphocytic colitis    Pain in unspecified joint     Joint pain    Personal history of other diseases of the circulatory system 05/16/2018    History of sinus bradycardia    Personal history of other diseases of the digestive system 08/28/2018    History of gastroesophageal reflux (GERD)    Personal history of other diseases of the digestive system     History of esophageal reflux    Radiculopathy, cervical region     Cervical radiculopathy    Radiculopathy, lumbosacral region     Lumbosacral radiculopathy at L5    Rheumatic diseases of endocardium,  "valve unspecified 2018    Rheumatic disease of heart valve       Past Surgical History:   Procedure Laterality Date    MR HEAD ANGIO WO IV CONTRAST  2021    MR HEAD ANGIO WO IV CONTRAST 2021 [facility]    MR NECK ANGIO WO IV CONTRAST  2021    MR NECK ANGIO WO IV CONTRAST 2021 [facility]    OTHER SURGICAL HISTORY  06/10/2013    Endoscopic Control Of Gastric Bleeding    OTHER SURGICAL HISTORY  2019    Hysterectomy    OTHER SURGICAL HISTORY  2019    Ankle surgery    OTHER SURGICAL HISTORY  2019    Cornea transplantation    OTHER SURGICAL HISTORY  2019    Colostomy    OTHER SURGICAL HISTORY  2019    Knee replacement        Social History     Tobacco Use    Smoking status: Former     Types: Cigarettes    Smokeless tobacco: Never   Vaping Use    Vaping status: Never Used   Substance Use Topics    Alcohol use: Yes     Alcohol/week: 7.0 standard drinks of alcohol     Types: 7 Shots of liquor per week    Drug use: Never        Social History     Social History Narrative    Is originally from [place] has lived in [place] for some time    He is  since the mid 80s    2 daughters 1 biologic who unfortunately  of glioblastoma and Barbara who is adopted    She has 2 grandsons    She did work as a teacher    Her diet is pretty healthy    She is very socially engaged in her [employer] has friends tries to play bridge    Was a previous smoker but not for many years    She does drink alcohol and usually 1 alcoholic beverage daily       Family History   Problem Relation Name Age of Onset    Bipolar disorder Mother      Depression Mother      Parkinsonism Mother      Other (Cardiac disorder) Father      Hypertension Father      Other (Glioblastoma) Daughter      Cancer Other Grandmother     Bipolar disorder Other Family         /64   Pulse 65   Ht 1.651 m (5' 5\")   Wt 63.5 kg (140 lb)   SpO2 90%   BMI 23.30 kg/m²     Physical Exam  He is accompanied " "today by her caregiver Gail as her daughter Barbara was diagnosed with COVID Barbara joins us by phone  Physical examination  Reveals a well-developed woman in no acute distress    appearance is age-appropriate she is frail  HEENT exam  Her eyes are tearing she does have rosacea  Extraocular motion is intact  Tympanic membranes and external auditory canals are normal  Oropharynx is normal  There is no cervical lymphadenopathy appreciated  The thyroid is within normal limits    Lungs    clear to auscultation and percussion    Cardiovascular   regular rate and rhythm  No murmurs rubs or gallops are appreciated    Breast examination   No dominant masses nipple discharge or axillary lymphadenopathy is appreciated    Abdomen   soft nontender bowel sounds are positive   there is no organomegaly noted      Periphery  Pulses are present without deficits noted  1+ edema present improved from what she had been in does not have monitor support stockings    Musculoskeletal  Gait is antalgic walks with a walker  Is no joint erythema or swelling noted  Range of motion is within normal limits  Strength is 5 of 5 without deficits noted    Dermatology  No concerning skin lesions are noted  Changes consistent with rosacea are present on face    Neurology  She has difficulty getting up from the seated position difficulty getting gait started  No deficits are noted  Judgment appears appropriate  Mood and affect are appropriate                                        No lab exists for component: \"UAPPEAR\", \"UCOLOR\"  No components found for: \"UA\"  Lab on 12/02/2024   Component Date Value Ref Range Status    Cholesterol 12/02/2024 170  0 - 199 mg/dL Final          Age      Desirable   Borderline High   High     0-19 Y     0 - 169       170 - 199     >/= 200    20-24 Y     0 - 189       190 - 224     >/= 225         >24 Y     0 - 199       200 - 239     >/= 240   **All ranges are based on fasting samples. Specific   therapeutic targets will " vary based on patient-specific   cardiac risk.    Pediatric guidelines reference:Pediatrics 2011, 128(S5).Adult guidelines reference: NCEP ATPIII Guidelines,HILDA 2001, 258:2486-97    Venipuncture immediately after or during the administration of Metamizole may lead to falsely low results. Testing should be performed immediately prior to Metamizole dosing.    HDL-Cholesterol 12/02/2024 50.0  mg/dL Final      Age       Very Low   Low     Normal    High    0-19 Y    < 35      < 40     40-45     ----  20-24 Y    ----     < 40      >45      ----        >24 Y      ----     < 40     40-60      >60      Cholesterol/HDL Ratio 12/02/2024 3.4   Final      Ref Values  Desirable  < 3.4  High Risk  > 5.0    LDL Calculated 12/02/2024 83  <=99 mg/dL Final                                Near   Borderline      AGE      Desirable  Optimal    High     High     Very High     0-19 Y     0 - 109     ---    110-129   >/= 130     ----    20-24 Y     0 - 119     ---    120-159   >/= 160     ----      >24 Y     0 -  99   100-129  130-159   160-189     >/=190      VLDL 12/02/2024 37  0 - 40 mg/dL Final    Triglycerides 12/02/2024 187 (H)  0 - 149 mg/dL Final    Age              Desirable        Borderline         High        Very High  SEX:B           mg/dL             mg/dL               mg/dL      mg/dL  <=14D                       ----               ----        ----  15D-365D                    ----               ----        ----  1Y-9Y           0-74               75-99             >=100       ----  10Y-19Y        0-89                            >=130       ----  20Y-24Y        0-114             115-149             >=150      ----  >= 25Y         0-149             150-199             200-499    >=500      Venipuncture immediately after or during the administration of Metamizole may lead to falsely low results. Testing should be performed immediately prior to Metamizole dosing.    Non HDL Cholesterol 12/02/2024 120  0 -  149 mg/dL Final          Age       Desirable   Borderline High   High     Very High     0-19 Y     0 - 119       120 - 144     >/= 145    >/= 160    20-24 Y     0 - 149       150 - 189     >/= 190      ----         >24 Y    30 mg/dL above LDL Cholesterol goal      Glucose 12/02/2024 100 (H)  74 - 99 mg/dL Final    Sodium 12/02/2024 145  136 - 145 mmol/L Final    Potassium 12/02/2024 4.7  3.5 - 5.3 mmol/L Final    Chloride 12/02/2024 103  98 - 107 mmol/L Final    Bicarbonate 12/02/2024 35 (H)  21 - 32 mmol/L Final    Anion Gap 12/02/2024 12  10 - 20 mmol/L Final    Urea Nitrogen 12/02/2024 35 (H)  6 - 23 mg/dL Final    Creatinine 12/02/2024 0.82  0.50 - 1.05 mg/dL Final    eGFR 12/02/2024 69  >60 mL/min/1.73m*2 Final    Calculations of estimated GFR are performed using the 2021 CKD-EPI Study Refit equation without the race variable for the IDMS-Traceable creatinine methods.  https://jasn.asnjournals.org/content/early/2021/09/22/ASN.7865975759    Calcium 12/02/2024 10.9 (H)  8.6 - 10.6 mg/dL Final    Albumin 12/02/2024 3.8  3.4 - 5.0 g/dL Final    Alkaline Phosphatase 12/02/2024 87  33 - 136 U/L Final    Total Protein 12/02/2024 6.2 (L)  6.4 - 8.2 g/dL Final    AST 12/02/2024 10  9 - 39 U/L Final    Bilirubin, Total 12/02/2024 1.0  0.0 - 1.2 mg/dL Final    ALT 12/02/2024 4 (L)  7 - 45 U/L Final    Patients treated with Sulfasalazine may generate falsely decreased results for ALT.    Thyroid Stimulating Hormone 12/02/2024 1.39  0.44 - 3.98 mIU/L Final    Color, Urine 12/02/2024 Light-Yellow  Light-Yellow, Yellow, Dark-Yellow Final    Appearance, Urine 12/02/2024 Clear  Clear Final    Specific Gravity, Urine 12/02/2024 1.012  1.005 - 1.035 Final    pH, Urine 12/02/2024 5.5  5.0, 5.5, 6.0, 6.5, 7.0, 7.5, 8.0 Final    Protein, Urine 12/02/2024 20 (TRACE)  NEGATIVE, 10 (TRACE), 20 (TRACE) mg/dL Final    Glucose, Urine 12/02/2024 Normal  Normal mg/dL Final    Blood, Urine 12/02/2024 NEGATIVE  NEGATIVE Final    Ketones,  Urine 12/02/2024 NEGATIVE  NEGATIVE mg/dL Final    Bilirubin, Urine 12/02/2024 NEGATIVE  NEGATIVE Final    Urobilinogen, Urine 12/02/2024 Normal  Normal mg/dL Final    Nitrite, Urine 12/02/2024 NEGATIVE  NEGATIVE Final    Leukocyte Esterase, Urine 12/02/2024 250 Tosha/µL (A)  NEGATIVE Final    Vitamin D, 25-Hydroxy, Total 12/02/2024 61  30 - 100 ng/mL Final    C-Reactive Protein 12/02/2024 <0.10  <1.00 mg/dL Final    Hemoglobin A1C 12/02/2024 5.2  See comment % Final    Estimated Average Glucose 12/02/2024 103  Not Established mg/dL Final    WBC 12/02/2024 6.7  4.4 - 11.3 x10*3/uL Final    nRBC 12/02/2024 0.0  0.0 - 0.0 /100 WBCs Final    RBC 12/02/2024 3.90 (L)  4.00 - 5.20 x10*6/uL Final    Hemoglobin 12/02/2024 12.9  12.0 - 16.0 g/dL Final    Hematocrit 12/02/2024 40.8  36.0 - 46.0 % Final    MCV 12/02/2024 105 (H)  80 - 100 fL Final    MCH 12/02/2024 33.1  26.0 - 34.0 pg Final    MCHC 12/02/2024 31.6 (L)  32.0 - 36.0 g/dL Final    RDW 12/02/2024 13.2  11.5 - 14.5 % Final    Platelets 12/02/2024 218  150 - 450 x10*3/uL Final    Neutrophils % 12/02/2024 72.4  40.0 - 80.0 % Final    Immature Granulocytes %, Automated 12/02/2024 0.4  0.0 - 0.9 % Final    Immature Granulocyte Count (IG) includes promyelocytes, myelocytes and metamyelocytes but does not include bands. Percent differential counts (%) should be interpreted in the context of the absolute cell counts (cells/UL).    Lymphocytes % 12/02/2024 16.1  13.0 - 44.0 % Final    Monocytes % 12/02/2024 9.9  2.0 - 10.0 % Final    Eosinophils % 12/02/2024 0.9  0.0 - 6.0 % Final    Basophils % 12/02/2024 0.3  0.0 - 2.0 % Final    Neutrophils Absolute 12/02/2024 4.85  1.60 - 5.50 x10*3/uL Final    Percent differential counts (%) should be interpreted in the context of the absolute cell counts (cells/uL).    Immature Granulocytes Absolute, Au* 12/02/2024 0.03  0.00 - 0.50 x10*3/uL Final    Lymphocytes Absolute 12/02/2024 1.08  0.80 - 3.00 x10*3/uL Final    Monocytes  Absolute 12/02/2024 0.66  0.05 - 0.80 x10*3/uL Final    Eosinophils Absolute 12/02/2024 0.06  0.00 - 0.40 x10*3/uL Final    Basophils Absolute 12/02/2024 0.02  0.00 - 0.10 x10*3/uL Final    WBC, Urine 12/02/2024 21-50 (A)  1-5, NONE /HPF Final    RBC, Urine 12/02/2024 3-5  NONE, 1-2, 3-5 /HPF Final    Squamous Epithelial Cells, Urine 12/02/2024 1-9 (SPARSE)  Reference range not established. /HPF Final    Renal Epithelial Cells, Urine 12/02/2024 1-2 (FEW)  Reference range not established. /HPF Final    Mucus, Urine 12/02/2024 FEW  Reference range not established. /LPF Final    Hyaline Casts, Urine 12/02/2024 3+ (A)  NONE /LPF Final     [unfilled]   No results found.   The ASCVD Risk score (Eastham DK, et al., 2019) failed to calculate for the following reasons:    The 2019 ASCVD risk score is only valid for ages 40 to 79   .      Problem List Items Addressed This Visit       Anxiety    Atrial fibrillation, persistent (Multi) - Primary    Heart failure with preserved left ventricular function (HFpEF)    Parkinson's disease (Multi)    Presence of Watchman left atrial appendage closure device    Primary hypertension       Assessment/Plan   #1 Parkinson's at this time seems pretty stable with current dose of Sinemet previous issue with her Parkinson's right aside from the frequent falls is the delusions the insomnia has been started on Namenda considering going back to rivastigmine patch but will need to discuss with neurology as there was a question of diarrhea from the patch in the past  2.  Atrial fibrillation persistent off anticoagulation did have Watchman procedure  3.  Heart failure stable Lasix dose has been increased to 1-1/2 twice daily seems to be doing pretty well with this dose and her kidney functions look okay  4.  Chronic kidney disease stable  5.  Hypertension good control  6.  COPD stable uses her inhaler does use nighttime oxygen as well need to check with how much she is supposed to be on as she  tells me she is doing 4 L at night  #7 spinal stenosis radiculopathy seems to be doing well  8.  Frequent falls going to PT she does have a life alert system  9.  Insomnia which certainly seems to be an issue with her confusion as well but at this point we will can continue her current regimen  10.  Peripheral edema diastolic heart function as well as venous insufficiency continue current regimen  11.  Anxiety stable we discussed not using the lorazepam as it is just not a good drug in the amount she is using I do not think it is doing much.  For her symptoms would just cause more side effects than anything else  12.  Health maintenance  Overall I do think she is doing pretty well  She does have increased help at home considering even getting more which is a good idea as she has a life alert system she has started to become more active with some Rafael at home as well  She is up-to-date with immunizations also

## 2024-12-16 NOTE — PATIENT INSTRUCTIONS
It was good to see you.  You are doing a good job with your overall health care.   We are not going to make any changes in your current medications.  As we discussed I do think taking MiraLAX 1 capful daily to start is fine.  If you start getting loose stool we can decrease the amount or the frequency that you are taking  It is fine to take MiraLAX on a daily basis  Continue to stay active both mentally and physically  Check with Dr. Klein about the possibility of restarting the rivastigmine patch  would need to be cautious with the possibility of increasing potential for diarrhea  Continue to really limit the use of lorazepam as it is just not a good medication overall and you are taking such little amount that I do not think it is doing much for you so lets try not to take it.  You are up-to-date with all immunizations including influenza, COVID, pneumonia, and RSV  Follow a brain healthy lifestyle, which includes  Controlling medical conditions such as diabetes, high blood pressure, high cholesterol, thyroid disease, sleep apnea , depression and anxiety  Use eyeglasses or hearing aids appropriately if indicated  Eat a heart healthy diet such as a Mediterranean /low-fat diet with abundant fresh fruits and vegetables as well as fish  Get adequate and routine regular exercise  Maintain good sleep hygiene avoiding daytime naps and trying to get 7 to 8 hours of uninterrupted sleep at nighttime  Stay mentally active which may include puzzles, word searches, reading, computer games, playing cards or board games  Stay socially active and engaged as well

## 2024-12-17 ASSESSMENT — PATIENT HEALTH QUESTIONNAIRE - PHQ9
SUM OF ALL RESPONSES TO PHQ9 QUESTIONS 1 AND 2: 0
2. FEELING DOWN, DEPRESSED OR HOPELESS: NOT AT ALL
1. LITTLE INTEREST OR PLEASURE IN DOING THINGS: NOT AT ALL

## 2024-12-21 DIAGNOSIS — R39.15 URINARY URGENCY: ICD-10-CM

## 2024-12-22 RX ORDER — MIRABEGRON 25 MG/1
25 TABLET, FILM COATED, EXTENDED RELEASE ORAL DAILY
Qty: 30 TABLET | Refills: 3 | Status: SHIPPED | OUTPATIENT
Start: 2024-12-22

## 2024-12-27 ENCOUNTER — HOSPITAL ENCOUNTER (EMERGENCY)
Facility: HOSPITAL | Age: 89
Discharge: HOME | End: 2024-12-27
Attending: EMERGENCY MEDICINE
Payer: MEDICARE

## 2024-12-27 ENCOUNTER — APPOINTMENT (OUTPATIENT)
Dept: CARDIOLOGY | Facility: HOSPITAL | Age: 89
End: 2024-12-27
Payer: MEDICARE

## 2024-12-27 VITALS
OXYGEN SATURATION: 97 % | SYSTOLIC BLOOD PRESSURE: 129 MMHG | HEIGHT: 66 IN | WEIGHT: 140 LBS | BODY MASS INDEX: 22.5 KG/M2 | RESPIRATION RATE: 16 BRPM | DIASTOLIC BLOOD PRESSURE: 87 MMHG | HEART RATE: 58 BPM | TEMPERATURE: 99 F

## 2024-12-27 DIAGNOSIS — R39.15 URINARY URGENCY: ICD-10-CM

## 2024-12-27 DIAGNOSIS — R41.82 ALTERED MENTAL STATUS, UNSPECIFIED ALTERED MENTAL STATUS TYPE: Primary | ICD-10-CM

## 2024-12-27 LAB
ALBUMIN SERPL BCP-MCNC: 3.9 G/DL (ref 3.4–5)
ALP SERPL-CCNC: 90 U/L (ref 33–136)
ALT SERPL W P-5'-P-CCNC: <3 U/L (ref 7–45)
ANION GAP SERPL CALC-SCNC: 13 MMOL/L (ref 10–20)
AST SERPL W P-5'-P-CCNC: 15 U/L (ref 9–39)
BASOPHILS # BLD AUTO: 0.03 X10*3/UL (ref 0–0.1)
BASOPHILS NFR BLD AUTO: 0.4 %
BILIRUB SERPL-MCNC: 0.9 MG/DL (ref 0–1.2)
BUN SERPL-MCNC: 38 MG/DL (ref 6–23)
CALCIUM SERPL-MCNC: 11 MG/DL (ref 8.6–10.3)
CARDIAC TROPONIN I PNL SERPL HS: 35 NG/L (ref 0–13)
CHLORIDE SERPL-SCNC: 101 MMOL/L (ref 98–107)
CO2 SERPL-SCNC: 31 MMOL/L (ref 21–32)
CREAT SERPL-MCNC: 1.07 MG/DL (ref 0.5–1.05)
EGFRCR SERPLBLD CKD-EPI 2021: 50 ML/MIN/1.73M*2
EOSINOPHIL # BLD AUTO: 0.04 X10*3/UL (ref 0–0.4)
EOSINOPHIL NFR BLD AUTO: 0.6 %
ERYTHROCYTE [DISTWIDTH] IN BLOOD BY AUTOMATED COUNT: 12.3 % (ref 11.5–14.5)
GLUCOSE SERPL-MCNC: 104 MG/DL (ref 74–99)
HCT VFR BLD AUTO: 39.8 % (ref 36–46)
HGB BLD-MCNC: 12.9 G/DL (ref 12–16)
HOLD SPECIMEN: NORMAL
HOLD SPECIMEN: NORMAL
IMM GRANULOCYTES # BLD AUTO: 0.03 X10*3/UL (ref 0–0.5)
IMM GRANULOCYTES NFR BLD AUTO: 0.4 % (ref 0–0.9)
LYMPHOCYTES # BLD AUTO: 1.05 X10*3/UL (ref 0.8–3)
LYMPHOCYTES NFR BLD AUTO: 15.2 %
MCH RBC QN AUTO: 32.3 PG (ref 26–34)
MCHC RBC AUTO-ENTMCNC: 32.4 G/DL (ref 32–36)
MCV RBC AUTO: 100 FL (ref 80–100)
MONOCYTES # BLD AUTO: 0.76 X10*3/UL (ref 0.05–0.8)
MONOCYTES NFR BLD AUTO: 11 %
NEUTROPHILS # BLD AUTO: 4.99 X10*3/UL (ref 1.6–5.5)
NEUTROPHILS NFR BLD AUTO: 72.4 %
NRBC BLD-RTO: 0 /100 WBCS (ref 0–0)
PLATELET # BLD AUTO: 195 X10*3/UL (ref 150–450)
POTASSIUM SERPL-SCNC: 4.5 MMOL/L (ref 3.5–5.3)
PROT SERPL-MCNC: 6.8 G/DL (ref 6.4–8.2)
RBC # BLD AUTO: 3.99 X10*6/UL (ref 4–5.2)
SODIUM SERPL-SCNC: 140 MMOL/L (ref 136–145)
WBC # BLD AUTO: 6.9 X10*3/UL (ref 4.4–11.3)

## 2024-12-27 PROCEDURE — 93005 ELECTROCARDIOGRAM TRACING: CPT

## 2024-12-27 PROCEDURE — 36415 COLL VENOUS BLD VENIPUNCTURE: CPT | Performed by: EMERGENCY MEDICINE

## 2024-12-27 PROCEDURE — 85025 COMPLETE CBC W/AUTO DIFF WBC: CPT | Performed by: EMERGENCY MEDICINE

## 2024-12-27 PROCEDURE — 80053 COMPREHEN METABOLIC PANEL: CPT | Performed by: EMERGENCY MEDICINE

## 2024-12-27 PROCEDURE — 99284 EMERGENCY DEPT VISIT MOD MDM: CPT | Performed by: EMERGENCY MEDICINE

## 2024-12-27 PROCEDURE — 84484 ASSAY OF TROPONIN QUANT: CPT | Performed by: EMERGENCY MEDICINE

## 2024-12-27 ASSESSMENT — COLUMBIA-SUICIDE SEVERITY RATING SCALE - C-SSRS
6. HAVE YOU EVER DONE ANYTHING, STARTED TO DO ANYTHING, OR PREPARED TO DO ANYTHING TO END YOUR LIFE?: NO
1. IN THE PAST MONTH, HAVE YOU WISHED YOU WERE DEAD OR WISHED YOU COULD GO TO SLEEP AND NOT WAKE UP?: NO
2. HAVE YOU ACTUALLY HAD ANY THOUGHTS OF KILLING YOURSELF?: NO

## 2024-12-27 ASSESSMENT — PAIN - FUNCTIONAL ASSESSMENT: PAIN_FUNCTIONAL_ASSESSMENT: 0-10

## 2024-12-27 ASSESSMENT — PAIN SCALES - GENERAL: PAINLEVEL_OUTOF10: 0 - NO PAIN

## 2024-12-28 NOTE — ED PROVIDER NOTES
HPI   Chief Complaint   Patient presents with    Altered Mental Status     Pt was at home tonight and called life alert button. EMS stated pt confused upon arrival. Pt A/Ox3 currently but admits to confusion earlier. Missed 5p meds today. No CP/SOB. No LOC/Fall. No Dizzy. Speaks with clear speech. Pt spo2 89% RA but admits to home O2 at night only. Pt placed on 2L  94%       This is an 89-year-old female who presents to the emergency department after pushing her alert button.  The patient states that she called because the kids went out to play and she was in the house by herself.  She reports that things in her house move although she knows that they do not really move.  She denies headache.  She denies dysuria.  She denies abdominal pain.  She denies shortness of breath.  She denies cough.  Her daughter was at the bedside reports that the patient has had these delusions for the past 6 months.  They have been working with her primary doctor.  The patient appears to be at her baseline.  Normally the patient calls the daughter who was able to explain that she is at home.  Tonight, however, the mother did not have the phone next to her and pushed the call button instead.  They now know that the patient can no longer stay by herself at night.  They will make arrangements to have someone stay overnight or possibly have her move into assisted living at [facility].  The patient nor daughter believe she needs any medical care.    Past medical history: Parkinson's, atrial fibrillation status post Watchman procedure, CKD, diverticulitis, peritonitis, COPD on home O2 at night              Patient History   Past Medical History:   Diagnosis Date    Acute sinusitis, unspecified 08/13/2013    Acute sinusitis    Adjustment disorder with depressed mood 05/01/2017    Grieving    Candidal esophagitis (Multi) 12/09/2020    Candida esophagitis    Familial hypercholesterolemia 12/09/2020    Familial hypercholesteremia     Lymphocytic colitis 08/28/2018    Lymphocytic colitis    Pain in unspecified joint     Joint pain    Personal history of other diseases of the circulatory system 05/16/2018    History of sinus bradycardia    Personal history of other diseases of the digestive system 08/28/2018    History of gastroesophageal reflux (GERD)    Personal history of other diseases of the digestive system     History of esophageal reflux    Radiculopathy, cervical region     Cervical radiculopathy    Radiculopathy, lumbosacral region     Lumbosacral radiculopathy at L5    Rheumatic diseases of endocardium, 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    Rheumatic disease of heart valve     Past Surgical History:   Procedure Laterality Date    MR HEAD ANGIO WO IV CONTRAST  9/9/2021    MR HEAD ANGIO WO IV CONTRAST 9/9/2021 [facility]    MR NECK ANGIO WO IV CONTRAST  9/9/2021    MR NECK ANGIO WO IV CONTRAST 9/9/2021 [facility]    OTHER SURGICAL HISTORY  06/10/2013    Endoscopic Control Of Gastric Bleeding    OTHER SURGICAL HISTORY  01/04/2019    Hysterectomy    OTHER SURGICAL HISTORY  01/22/2019    Ankle surgery    OTHER SURGICAL HISTORY  01/22/2019    Cornea transplantation    OTHER SURGICAL HISTORY  01/22/2019    Colostomy    OTHER SURGICAL HISTORY  01/22/2019    Knee replacement     Family History   Problem Relation Name Age of Onset    Bipolar disorder Mother      Depression Mother      Parkinsonism Mother      Other (Cardiac disorder) Father      Hypertension Father      Other (Glioblastoma) Daughter      Cancer Other Grandmother     Bipolar disorder Other Family      Social History     Tobacco Use    Smoking status: Former     Types: Cigarettes    Smokeless tobacco: Never   Vaping Use    Vaping status: Never Used   Substance Use Topics    Alcohol use: Yes     Alcohol/week: 7.0 standard drinks of alcohol     Types: 7 Shots of liquor per week    Drug use: Never       Physical Exam   ED Triage Vitals [12/27/24 1950]   Temperature Heart  Rate Respirations BP   37.2 °C (99 °F) 63 18 130/53      Pulse Ox Temp Source Heart Rate Source Patient Position   (!) 93 % Temporal Monitor --      BP Location FiO2 (%)     -- --       Physical Exam  Vitals and nursing note reviewed.   HENT:      Head: Normocephalic and atraumatic.      Nose: Nose normal.   Eyes:      Conjunctiva/sclera: Conjunctivae normal.   Cardiovascular:      Rate and Rhythm: Normal rate and regular rhythm.      Pulses: Normal pulses.      Heart sounds: Normal heart sounds.   Pulmonary:      Effort: Pulmonary effort is normal.      Breath sounds: Normal breath sounds.   Abdominal:      General: Bowel sounds are normal.      Palpations: Abdomen is soft.   Musculoskeletal:         General: Normal range of motion.      Cervical back: Normal range of motion and neck supple.   Skin:     Findings: No rash.   Neurological:      General: No focal deficit present.      Mental Status: She is alert and oriented to person, place, and time.   Psychiatric:         Mood and Affect: Mood normal.           ED Course & MDM   Diagnoses as of 12/27/24 2109   Altered mental status, unspecified altered mental status type                 No data recorded     Marium Coma Scale Score: 15 (12/27/24 1952 : Emilee Maldonado RN)       NIH Stroke Scale: 0 (12/27/24 1955 : Emilee Maldonado RN)                   Medical Decision Making  Differential diagnosis: I have considered the following conditions in my assessment of this patient's condition: Delirium, alcohol intoxication, CVA, hepatic encephalopathy, encephalitis, hyponatremia, intracranial hemorrhage, hypoglycemia, meningitis, overdose, seizure and postictal state, sepsis.    This is an 89-year-old female with a history of Parkinson's and delusions who presents to the emergency department after activating her life alert call button due to delusions.  The patient and daughter do not feel that she needs any medical care.  She is at her baseline.  Triage labs were  performed and were at her baseline.  She had mild elevation of troponin similar to previous.  She had mild elevation of creatinine of 1.07.  The daughter is comfortable caring for her at home.  She requests discharge.  This is reasonable.  The daughter will either increase her aides at home to stay overnight with her or move her into the assisted living section of [facility].    Amount and/or Complexity of Data Reviewed  ECG/medicine tests: independent interpretation performed.     Details: Atrial fibrillation, heart rate 75, no ST elevation, no significant change from previous EKG.        Procedure  Procedures     Miguel Everett MD  12/27/24 6512

## 2024-12-31 RX ORDER — VIBEGRON 75 MG/1
75 TABLET, FILM COATED ORAL DAILY
Qty: 30 TABLET | Refills: 2 | Status: SHIPPED | OUTPATIENT
Start: 2024-12-31

## 2025-01-16 LAB
ATRIAL RATE: 72 BPM
Q ONSET: 218 MS
QRS COUNT: 12 BEATS
QRS DURATION: 90 MS
QT INTERVAL: 378 MS
QTC CALCULATION(BAZETT): 422 MS
QTC FREDERICIA: 406 MS
R AXIS: 110 DEGREES
T AXIS: 11 DEGREES
T OFFSET: 407 MS
VENTRICULAR RATE: 75 BPM

## 2025-01-21 ENCOUNTER — OFFICE VISIT (OUTPATIENT)
Dept: CARDIOLOGY | Facility: CLINIC | Age: 89
End: 2025-01-21
Payer: MEDICARE

## 2025-01-21 VITALS
OXYGEN SATURATION: 93 % | BODY MASS INDEX: 22.49 KG/M2 | HEIGHT: 65 IN | SYSTOLIC BLOOD PRESSURE: 138 MMHG | HEART RATE: 62 BPM | WEIGHT: 135 LBS | DIASTOLIC BLOOD PRESSURE: 70 MMHG

## 2025-01-21 DIAGNOSIS — Z95.818 PRESENCE OF WATCHMAN LEFT ATRIAL APPENDAGE CLOSURE DEVICE: ICD-10-CM

## 2025-01-21 DIAGNOSIS — E78.5 HYPERLIPIDEMIA, UNSPECIFIED HYPERLIPIDEMIA TYPE: ICD-10-CM

## 2025-01-21 DIAGNOSIS — I48.19 ATRIAL FIBRILLATION, PERSISTENT (MULTI): Primary | ICD-10-CM

## 2025-01-21 DIAGNOSIS — I50.30 HEART FAILURE WITH PRESERVED LEFT VENTRICULAR FUNCTION (HFPEF): ICD-10-CM

## 2025-01-21 DIAGNOSIS — I10 HYPERTENSION, UNSPECIFIED TYPE: ICD-10-CM

## 2025-01-21 DIAGNOSIS — I35.0 MILD AORTIC STENOSIS: ICD-10-CM

## 2025-01-21 PROCEDURE — 3078F DIAST BP <80 MM HG: CPT | Performed by: INTERNAL MEDICINE

## 2025-01-21 PROCEDURE — 99214 OFFICE O/P EST MOD 30 MIN: CPT | Performed by: INTERNAL MEDICINE

## 2025-01-21 PROCEDURE — 3075F SYST BP GE 130 - 139MM HG: CPT | Performed by: INTERNAL MEDICINE

## 2025-01-21 PROCEDURE — 1159F MED LIST DOCD IN RCRD: CPT | Performed by: INTERNAL MEDICINE

## 2025-01-21 PROCEDURE — 1157F ADVNC CARE PLAN IN RCRD: CPT | Performed by: INTERNAL MEDICINE

## 2025-01-21 PROCEDURE — 1125F AMNT PAIN NOTED PAIN PRSNT: CPT | Performed by: INTERNAL MEDICINE

## 2025-01-21 ASSESSMENT — ENCOUNTER SYMPTOMS
DYSURIA: 0
DIARRHEA: 0
WHEEZING: 0
HEMATURIA: 0
DEPRESSION: 1
OCCASIONAL FEELINGS OF UNSTEADINESS: 1
HEMOPTYSIS: 0
HEADACHES: 0
NAUSEA: 0
MEMORY LOSS: 0
FALLS: 0
LOSS OF SENSATION IN FEET: 1
MYALGIAS: 0
ALTERED MENTAL STATUS: 0
FEVER: 0
COUGH: 0
ABDOMINAL PAIN: 0
VOMITING: 0
CONSTIPATION: 0
BLOATING: 0
DEPRESSION: 0
CHILLS: 0

## 2025-01-21 ASSESSMENT — COLUMBIA-SUICIDE SEVERITY RATING SCALE - C-SSRS
2. HAVE YOU ACTUALLY HAD ANY THOUGHTS OF KILLING YOURSELF?: NO
6. HAVE YOU EVER DONE ANYTHING, STARTED TO DO ANYTHING, OR PREPARED TO DO ANYTHING TO END YOUR LIFE?: NO
1. IN THE PAST MONTH, HAVE YOU WISHED YOU WERE DEAD OR WISHED YOU COULD GO TO SLEEP AND NOT WAKE UP?: NO

## 2025-01-21 ASSESSMENT — PATIENT HEALTH QUESTIONNAIRE - PHQ9
1. LITTLE INTEREST OR PLEASURE IN DOING THINGS: NOT AT ALL
SUM OF ALL RESPONSES TO PHQ9 QUESTIONS 1 AND 2: 0
2. FEELING DOWN, DEPRESSED OR HOPELESS: NOT AT ALL

## 2025-01-21 ASSESSMENT — PAIN SCALES - GENERAL: PAINLEVEL_OUTOF10: 2

## 2025-01-21 NOTE — PROGRESS NOTES
Chief Complaint   Patient presents with    Atrial Fibrillation    Heart Failure        HPI  89 yo WF w/ h/o parox -> pers AFIB s/p DCCV 11/21 + 2/22, s/p Watchman 9/22, HFpEF, pulm HTN, mild AS, cor calc on CT, athero of Ao, HTN, HLD, GERD/PUD, COPD, MARYANN (intol CPAP), anemia, Parkinsons, ?TIA 9/21 now here for cardiology f/u.   No chest pain. No dyspnea at rest. +occ BUSTAMANTE (mod exertion), mildly improved on Lasix, ?worse in AFIB. No orthopnea/PND. No palps. +occ LH on standing up. No syncope. +ch LE edema, improved on Lasix. No claudication. No cough. +occ fatigue, ?worse in AFIB. +long h/o rare-occ soft fall (imbalance).  WGT at home: 130-135 lbs  WGT at appts: 6/21- 189, 7/21- 177, 12/, 3/22- 175lbs; 6/22- 170lbs; 1/23- 162lbs; 7/23- 152lb; 1/24- 138lbs; 1/25- 135 lbs  ECG 4/19: AFIB (106)  ECG 5/21: SR (73), PACs, QTc 445  ECG 7/21: SB (57), QTc 430  ECG 9/21: SR (68)  ECG 9/21: SB (55)  ECG 11/21: AFIB (92)  ECG 11/21: SR (60), PACs  ECG 2/22: AFIB/FL (89)  ECG 3/22: AFIB/FL (78)  ECG 4/22: AFIB (76)  ECG 4/22: SB (51), PAC  ECG 5/22: AFIB (94)  ECG 5/22: AFIB (61)  ECG 8/22: AFIB (62)  ECG 10/22: AFIB (65), nonsp ST changes  ECG 1/23: AFIB (57)  ECG 4/23: AFIB (58)  ECG 10/23: AFIB (54), nonsp ST-T changes  ECG 12/24: AFIB (75), RAD  HM 5/22: %, HR  (avg 80), HR 31 at 430am, 80 pauses (long 4.2s at 1250am)  Echo 9/18: EF 70%, DD, mils AS, mid-mod TR, PASP 53, small PE  Echo 10/19: EF 65-70%, DD, mod-sev LAE, mild AS, PASP 43  Echo 5/21: EF 70-75%, DD, nl RV, sev LAE, mild AS, mild-mod TR, PASP 73, nl IVC  Echo 4/22: TDS, EF 65-70%, mod-sev LAE, mod AS (26/12/1.2), mod TR, PASP 73 [AS likely at most mild-mod based on numbers]  CXR 5/21: sm B pl eff  CXR 9/21: CM, rae vasc cielo  CXR 4/22: no acute abnl  CXR 5/22: no acute abnl  CXR 1/24: sm-mod R pleur eff, ?int edema  CT chest 8/12: mod cor calc, mod athero of Ao  CT Watchman 9/22: no thrombus, mod athero of Ao  CTA heart 11/22: mild vs mod cor  calc, sev SANDY, small amount of contrast within device w/ ext to part thromb atrial appendage most likely c/w incomplete endothel of the device, no evidence of braulio-device leak or thrombus  PFT 7/20: mild obst (no resp), no rest, nl DLCO  CT ab 9/18: no AAA  CT/MRI brain 9/21: no acute abnl  CT brain 8/22: no acute abnl  CT brain 1/23: no acute abnl  MRA brain 9/21: no sig stenosis  MRA neck 9/21: LICA 25-30%, AMANDA 20%     Review of Systems   Constitutional: Negative for chills, fever and malaise/fatigue.   HENT:  Negative for hearing loss.    Eyes:  Negative for visual disturbance.   Respiratory:  Negative for cough, hemoptysis and wheezing.    Skin:  Negative for rash.   Musculoskeletal:  Negative for falls and myalgias.   Gastrointestinal:  Negative for bloating, abdominal pain, constipation, diarrhea, dysphagia, nausea and vomiting.   Genitourinary:  Negative for dysuria and hematuria.   Neurological:  Negative for headaches.   Psychiatric/Behavioral:  Negative for altered mental status, depression and memory loss.       Social History     Tobacco Use    Smoking status: Former     Types: Cigarettes    Smokeless tobacco: Never   Substance Use Topics    Alcohol use: Yes     Alcohol/week: 7.0 standard drinks of alcohol     Types: 7 Shots of liquor per week      Family History   Problem Relation Name Age of Onset    Bipolar disorder Mother      Depression Mother      Parkinsonism Mother      Other (Cardiac disorder) Father      Hypertension Father      Other (Glioblastoma) Daughter      Cancer Other Grandmother     Bipolar disorder Other Family       Allergies   Allergen Reactions    Morphine Itching    Hydrochlorothiazide Rash      Current Outpatient Medications   Medication Instructions    BABY ASPIRIN ORAL 81 mg, Daily    Bifidobacterium infantis (ALIGN ORAL) Take by mouth.    biotin 5 mg capsule Take by mouth. Take as directed    budesonide EC (ENTOCORT EC) 9 mg, oral, Daily    busPIRone (BUSPAR) 15 mg, oral, 2  times daily    carbidopa-levodopa (Sinemet CR)  mg ER tablet 1 tablet, Every evening    carbidopa-levodopa (Sinemet)  mg tablet Take 2 tablets in the morning; and 2 tablets at noon , 1.5 evening.    cholecalciferol (Vitamin D3) 50 mcg (2,000 unit) capsule Take as directed    cyanocobalamin (VITAMIN B-12) 1,000 mcg, Daily RT    dexAMETHasone (Decadron) 0.1 % ophthalmic solution INSTILL 1 DROP INTO BOTH EYES EVERY DAY AS DIRECTED    digoxin (LANOXIN) 125 mcg, oral, Daily, Take 1 tablet 5 days of the week.    DULoxetine (CYMBALTA) 30 mg, oral, 2 times daily    famotidine (Pepcid) 20 mg tablet 1 tablet, Daily    ferrous gluconate (Fergon) 240 (27 Fe) MG tablet 1 tablet, Daily    furosemide (LASIX) 40 mg, oral, 3 times daily    Incruse Ellipta 62.5 mcg, inhalation, Daily    losartan (COZAAR) 50 mg, oral, Daily    melatonin 5 mg capsule 1 tablet, Nightly    memantine (NAMENDA) 5 mg, 2 times daily    metoprolol succinate XL (TOPROL-XL) 12.5 mg, oral, Daily    metroNIDAZOLE (Metrogel) 0.75 % gel Apply daily for rosacea    mirtazapine (REMERON) 7.5 mg, oral, Nightly    oxygen (O2) gas therapy 2 Liters via NC with sleep    pantoprazole (PROTONIX) 40 mg, oral, Daily before breakfast    simvastatin (ZOCOR) 20 mg, oral, Daily    traZODone (DESYREL) 50 mg, oral, Nightly    turmeric root extract 500 mg tablet Take as directed        Vitals:    01/21/25 1033   BP: 138/70   Pulse: 62   SpO2: 93%     Physical Exam  Constitutional:       Appearance: Normal appearance.   HENT:      Head: Normocephalic and atraumatic.      Nose: Nose normal.   Neck:      Vascular: No carotid bruit.   Cardiovascular:      Rate and Rhythm: Normal rate. Rhythm irregular.      Heart sounds: No murmur heard.     Comments: Tr LE edema  Pulmonary:      Effort: Pulmonary effort is normal.      Breath sounds: Normal breath sounds.   Abdominal:      Palpations: Abdomen is soft.      Tenderness: There is no abdominal tenderness.   Musculoskeletal:       Right lower leg: Edema present.      Left lower leg: Edema present.   Skin:     General: Skin is warm and dry.   Neurological:      General: No focal deficit present.      Mental Status: She is alert.   Psychiatric:         Mood and Affect: Mood normal.         Behavior: Behavior normal.         Thought Content: Thought content normal.         Judgment: Judgment normal.        Results/Data  12/24 Cr 1.07, K 4.5, LFT nl, LDL 83, HDL 50, , Chol 170, HGB 12.9, , hgba1c 5.2, TSH 1.39, CRP <0.10, HSTPN 35  6/24 Cr 0.83, K 4.3  3/24 Cr 0.87, K 4.2, TSH 0.93, Dig 1.21  10/23 Cr 0.91, K 3.8, Dig 1.93  9/23 HGB 11.7  4/23 Cr 0.97, K 4.8, ALT/AST nl, LDL 92, HDL 45, , Chol 176, HGB 12, , TSH 0.68, CRP <0.1, Dig 1.28  10/22 Cr 1.0, K 4.3  9/22 Cr 1.05, K 4.7, HGB 11,   8/22 Cr 1.06, K 4.4, LFT nl  5/22 Cr 1.01, K 3.8, Dig 1.55  4/22 Cr 0.82, K 3.8, Mg 2.4, LFT nl, HGB 10, , TPN neg,  -> 283  12/21 Cr 1.1, K 4.3, Mg 1.98  11/21 Cr 1.23, K 3.8, LFT nl, HGB 12.6, , TSH 0.69  9/21 LDL 76, HDL 64, TG 81, Chol 157, hgba1c 6.0, TPN neg,   6/21 Cr 1.04, K 5.1, Mg 2.5, LFT nl, HGB 9.9,   5/21 TPN 0.08,   9/17   12/18 LDL 86, HDL 62, , Chol 169     Assessment/Plan   88 yo WF w/ h/o parox -> pers AFIB s/p DCCV 11/21 + 2/22, s/p Watchman 9/22, HFpEF, pulm HTN, mild AS, cor calc on CT, athero of Ao, HTN, HLD, GERD/PUD, COPD, MARYANN (intol CPAP), anemia, Parkinsons, ?TIA 9/21. Doing well. Near compensated.  Pulm HTN likely related to CHF as well. Can re-assess once compensated. Will remain conservative in this elderly patient.  -continue ASA 81 qd (with food)  -continue Metoprolol Succinate 25 qd  -continue Dig 0.125 qd  -continue Losartan 50 every day (can increase if needed for HTN)  -continue Furosemide 60 bid (she prefers not to increase more due to polyuria)  -consider resume Waldron in future if needed and kidney allows  -continue Simva 20 qd  -low  salt, high K/Mg diet  -f/u 6 months (earlier if needed)     Kuldip Huddleston MD

## 2025-02-01 DIAGNOSIS — F51.01 PRIMARY INSOMNIA: ICD-10-CM

## 2025-02-01 RX ORDER — TRAZODONE HYDROCHLORIDE 50 MG/1
50 TABLET ORAL NIGHTLY
Qty: 90 TABLET | Refills: 3 | Status: SHIPPED | OUTPATIENT
Start: 2025-02-01

## 2025-03-01 DIAGNOSIS — K52.832 LYMPHOCYTIC COLITIS: ICD-10-CM

## 2025-03-01 RX ORDER — BUDESONIDE 3 MG/1
9 CAPSULE, COATED PELLETS ORAL DAILY
Qty: 270 CAPSULE | Refills: 0 | Status: SHIPPED | OUTPATIENT
Start: 2025-03-01

## 2025-03-14 DIAGNOSIS — K21.9 GASTROESOPHAGEAL REFLUX DISEASE, UNSPECIFIED WHETHER ESOPHAGITIS PRESENT: ICD-10-CM

## 2025-03-14 DIAGNOSIS — I10 PRIMARY HYPERTENSION: ICD-10-CM

## 2025-03-14 RX ORDER — LOSARTAN POTASSIUM 50 MG/1
50 TABLET ORAL DAILY
Qty: 90 TABLET | Refills: 3 | Status: SHIPPED | OUTPATIENT
Start: 2025-03-14

## 2025-03-14 RX ORDER — PANTOPRAZOLE SODIUM 40 MG/1
40 TABLET, DELAYED RELEASE ORAL
Qty: 90 TABLET | Refills: 2 | Status: SHIPPED | OUTPATIENT
Start: 2025-03-14

## 2025-03-16 DIAGNOSIS — F41.9 ANXIETY: ICD-10-CM

## 2025-03-16 RX ORDER — BUSPIRONE HYDROCHLORIDE 15 MG/1
15 TABLET ORAL 2 TIMES DAILY
Qty: 180 TABLET | Refills: 3 | Status: SHIPPED | OUTPATIENT
Start: 2025-03-16

## 2025-04-07 DIAGNOSIS — J44.9 CHRONIC OBSTRUCTIVE PULMONARY DISEASE, UNSPECIFIED COPD TYPE (MULTI): ICD-10-CM

## 2025-04-07 RX ORDER — UMECLIDINIUM 62.5 UG/1
1 AEROSOL, POWDER ORAL DAILY
Qty: 90 EACH | Refills: 3 | Status: SHIPPED | OUTPATIENT
Start: 2025-04-07

## 2025-04-21 ENCOUNTER — TELEPHONE (OUTPATIENT)
Dept: GASTROENTEROLOGY | Facility: CLINIC | Age: 89
End: 2025-04-21
Payer: MEDICARE

## 2025-04-21 DIAGNOSIS — K58.0 IRRITABLE BOWEL SYNDROME WITH DIARRHEA: Primary | ICD-10-CM

## 2025-04-21 NOTE — TELEPHONE ENCOUNTER
Barbara, patient's daughter called requesting Xifaxan be called in due to uncontrollable diarrhea lasting 2 weeks. She states her mom's sleep is also being disrupted from the accidents at night.     [facility]       Thank You

## 2025-04-28 DIAGNOSIS — R35.0 URINARY FREQUENCY: ICD-10-CM

## 2025-04-30 NOTE — PROGRESS NOTES
Subjective   Patient ID: Adelina Case is a 89 y.o. female.    HPI  Patient presents today in follow-up she has not been seen for a while  she is accompanied by her daughter Barbara and care giver Linda  too    Overall she and caregivers feel she is doing okay.  She has not had any more falls.  They have definitely noticed more confusion and more in the evening especially with taking evening medications as sometimes taken her evening medications too early  She has caregivers for much of the daytime usually from around 9 AM to 2:30 PM  They are considering the possibility of adding an evening caregiver at least for a few hours.  They have explored the possibility of assisted living at [facility] when the time comes.  Initially were considering an independent apartment but I agree that they would not gain anything from this is still would need caregivers  She does note that had a bout of diarrhea but fortunately was controlled with just increase your dose of budesonide is now trying to wean back down off the budesonide slightly  The peripheral edema seems very well-controlled she takes 60 mg of Lasix twice daily  She last saw Dr Huddleston in [place]       Past medical history significant for  Parkinson's disease follows with neurology through [facility] Dr. Klein  She has been having more delusions and memory issues on Namenda  Frequent falls   Atrial fibrillation persistent she had Watchman procedure at high risk for anticoagulation because of frequent falls  Sleep apnea  COPD on nocturnal oxygen  Chronic peripheral edema  Heart failure  Hypertension  Diarrhea/collagenous colitis question of small intestinal bacterial overgrowth had been on Xifaxan in the  Chronic insomnia using mirtazapine  Chronic kidney disease    Review of Systems    Objective   Physical Exam  Well-developed elderly frail no acute distress  HEENT is unremarkable  Lungs are clear to auscultation percussion  Cardiovascular Irregular but  rate around 78  Periphery is with 1+ edema improved  Gait is ataxic has some difficulty getting up from a seated position she walks with a 3 wheeled walker  MoCA was performed today and actually did pretty well 24/30  She missed all 5 recall items and was off on the day of the week but otherwise reasonable    Assessment/Plan   #1 Parkinson's she follows routinely with neurology she receives Botox and has been on a pretty stable dose of the carbidopa levodopa preparations continue current regimen  2.  Cognitive impairment she did pretty well on the MoCA today seems to be more in the evening more sundowning type issues she has had delusions hallucinations thought there was a protest outside her door the police did come she is going to discuss this more with her neurologist likely related to her Parkinson's  3.  Congestive heart failure she has been very stable on her current Lasix we will continue that she last had blood work in December to recheck her routine serum electrolytes  4.  Atrial fibrillation she has Watchman doing fine  5.  Hypertension blood pressure elevated today but she had not taken her meds therefore did not make any changes could increase her losartan if indicated  6.  Weight loss this is actually now stable as she she gained a few pounds which is good news  7.  Chronic peripheral edema markedly improved with her current regimen  8.  Diarrhea microscopic colitis versus bacterial overgrowth as she had done well with increasing her dose of budesonide seems to be at baseline they are trying to again decrease that dose  9.  Health maintenance  We again discussed that safety is my biggest concern she does have caregivers for much of the day adding a caregiver at night for at least a few hours is a good option they have looked into assisted living so on their radar as well  50 minutes were spent with patient of which greater than 50% was spent in counseling and coordination of care  This note was  partially generated using the Dragon voice recognition system.  There may be some incorrect wording ,grammar, spelling or punctuation errors that were not corrected prior to committing the note to the medical record.

## 2025-05-01 ENCOUNTER — OFFICE VISIT (OUTPATIENT)
Dept: PRIMARY CARE | Facility: CLINIC | Age: 89
End: 2025-05-01
Payer: MEDICARE

## 2025-05-01 VITALS
SYSTOLIC BLOOD PRESSURE: 144 MMHG | BODY MASS INDEX: 22.38 KG/M2 | DIASTOLIC BLOOD PRESSURE: 70 MMHG | HEART RATE: 69 BPM | WEIGHT: 134.5 LBS | OXYGEN SATURATION: 98 %

## 2025-05-01 DIAGNOSIS — I50.30 HEART FAILURE WITH PRESERVED LEFT VENTRICULAR FUNCTION (HFPEF): ICD-10-CM

## 2025-05-01 DIAGNOSIS — R60.0 LOCALIZED EDEMA: Primary | ICD-10-CM

## 2025-05-01 DIAGNOSIS — G20.A1 PARKINSON'S DISEASE, UNSPECIFIED WHETHER DYSKINESIA PRESENT, UNSPECIFIED WHETHER MANIFESTATIONS FLUCTUATE: ICD-10-CM

## 2025-05-01 DIAGNOSIS — I10 PRIMARY HYPERTENSION: ICD-10-CM

## 2025-05-01 DIAGNOSIS — I48.19 ATRIAL FIBRILLATION, PERSISTENT (MULTI): ICD-10-CM

## 2025-05-01 DIAGNOSIS — G31.84 MCI (MILD COGNITIVE IMPAIRMENT): ICD-10-CM

## 2025-05-01 LAB
APPEARANCE UR: CLEAR
BACTERIA #/AREA URNS HPF: ABNORMAL /HPF
BACTERIA UR CULT: NORMAL
BILIRUB UR QL STRIP: NEGATIVE
COLOR UR: YELLOW
GLUCOSE UR QL STRIP: NEGATIVE
HGB UR QL STRIP: NEGATIVE
HYALINE CASTS #/AREA URNS LPF: ABNORMAL /LPF
KETONES UR QL STRIP: NEGATIVE
LEUKOCYTE ESTERASE UR QL STRIP: ABNORMAL
NITRITE UR QL STRIP: NEGATIVE
PH UR STRIP: 5.5 [PH] (ref 5–8)
PROT UR QL STRIP: ABNORMAL
RBC #/AREA URNS HPF: ABNORMAL /HPF
SERVICE CMNT-IMP: ABNORMAL
SP GR UR STRIP: 1.01 (ref 1–1.03)
SQUAMOUS #/AREA URNS HPF: ABNORMAL /HPF
WBC #/AREA URNS HPF: ABNORMAL /HPF

## 2025-05-01 PROCEDURE — 99215 OFFICE O/P EST HI 40 MIN: CPT | Performed by: INTERNAL MEDICINE

## 2025-05-01 PROCEDURE — 1157F ADVNC CARE PLAN IN RCRD: CPT | Performed by: INTERNAL MEDICINE

## 2025-05-01 PROCEDURE — 1036F TOBACCO NON-USER: CPT | Performed by: INTERNAL MEDICINE

## 2025-05-01 PROCEDURE — 3078F DIAST BP <80 MM HG: CPT | Performed by: INTERNAL MEDICINE

## 2025-05-01 PROCEDURE — 1160F RVW MEDS BY RX/DR IN RCRD: CPT | Performed by: INTERNAL MEDICINE

## 2025-05-01 PROCEDURE — 3077F SYST BP >= 140 MM HG: CPT | Performed by: INTERNAL MEDICINE

## 2025-05-01 PROCEDURE — 1159F MED LIST DOCD IN RCRD: CPT | Performed by: INTERNAL MEDICINE

## 2025-05-01 RX ORDER — FUROSEMIDE 40 MG/1
60 TABLET ORAL 2 TIMES DAILY
Qty: 180 TABLET | Refills: 3 | Status: SHIPPED | OUTPATIENT
Start: 2025-05-01

## 2025-05-01 NOTE — PATIENT INSTRUCTIONS
It was good to see you!  I do think you are doing well overall  My biggest concern is to be sure that you do remain safe in your home environment.  I do agree that this may be some additional help in the evenings.  As we discussed they are there for your safety and you do not need to entertain I am glad you are considering assisted living when the time is appropriate.  I would like you to discuss the cognitive decline you have experienced with Dr. Klein when you see him next  Your blood pressure was slightly high today but probably because you had not taken your medications  We are going to check some blood work today      Follow a brain healthy lifestyle, which includes  Controlling medical conditions such as diabetes, high blood pressure, high cholesterol, thyroid disease, sleep apnea , depression and anxiety  Use eyeglasses or hearing aids appropriately if indicated  Eat a heart healthy diet such as a Mediterranean /low-fat diet with abundant fresh fruits and vegetables as well as fish  Get adequate and routine regular exercise  Maintain good sleep hygiene avoiding daytime naps and trying to get 7 to 8 hours of uninterrupted sleep at nighttime  Stay mentally active which may include puzzles, word searches, reading, computer games, playing cards or board games  Stay socially active and engaged as well

## 2025-05-02 DIAGNOSIS — E87.5 HYPERKALEMIA: Primary | ICD-10-CM

## 2025-05-02 LAB
ALBUMIN SERPL-MCNC: 4.2 G/DL (ref 3.6–5.1)
ALP SERPL-CCNC: 102 U/L (ref 37–153)
ALT SERPL-CCNC: 11 U/L (ref 6–29)
ANION GAP SERPL CALCULATED.4IONS-SCNC: 7 MMOL/L (CALC) (ref 7–17)
AST SERPL-CCNC: 13 U/L (ref 10–35)
BILIRUB SERPL-MCNC: 0.7 MG/DL (ref 0.2–1.2)
BUN SERPL-MCNC: 51 MG/DL (ref 7–25)
CALCIUM SERPL-MCNC: 11 MG/DL (ref 8.6–10.4)
CHLORIDE SERPL-SCNC: 102 MMOL/L (ref 98–110)
CO2 SERPL-SCNC: 32 MMOL/L (ref 20–32)
CREAT SERPL-MCNC: 1.01 MG/DL (ref 0.6–0.95)
EGFRCR SERPLBLD CKD-EPI 2021: 54 ML/MIN/1.73M2
ERYTHROCYTE [DISTWIDTH] IN BLOOD BY AUTOMATED COUNT: 12.1 % (ref 11–15)
GLUCOSE SERPL-MCNC: 92 MG/DL (ref 65–99)
HCT VFR BLD AUTO: 43.3 % (ref 35–45)
HGB BLD-MCNC: 13.9 G/DL (ref 11.7–15.5)
MCH RBC QN AUTO: 31.4 PG (ref 27–33)
MCHC RBC AUTO-ENTMCNC: 32.1 G/DL (ref 32–36)
MCV RBC AUTO: 98 FL (ref 80–100)
METHYLMALONATE SERPL-SCNC: NORMAL
PLATELET # BLD AUTO: 202 THOUSAND/UL (ref 140–400)
PMV BLD REES-ECKER: 11 FL (ref 7.5–12.5)
POTASSIUM SERPL-SCNC: 5.4 MMOL/L (ref 3.5–5.3)
PROT SERPL-MCNC: 6.7 G/DL (ref 6.1–8.1)
RBC # BLD AUTO: 4.42 MILLION/UL (ref 3.8–5.1)
SODIUM SERPL-SCNC: 141 MMOL/L (ref 135–146)
TSH SERPL-ACNC: 0.69 MIU/L (ref 0.4–4.5)
VIT B12 SERPL-MCNC: >2000 PG/ML (ref 200–1100)
WBC # BLD AUTO: 9.4 THOUSAND/UL (ref 3.8–10.8)

## 2025-05-07 LAB
ALBUMIN SERPL-MCNC: 4.2 G/DL (ref 3.6–5.1)
ALP SERPL-CCNC: 102 U/L (ref 37–153)
ALT SERPL-CCNC: 11 U/L (ref 6–29)
ANION GAP SERPL CALCULATED.4IONS-SCNC: 7 MMOL/L (CALC) (ref 7–17)
AST SERPL-CCNC: 13 U/L (ref 10–35)
BILIRUB SERPL-MCNC: 0.7 MG/DL (ref 0.2–1.2)
BUN SERPL-MCNC: 51 MG/DL (ref 7–25)
CALCIUM SERPL-MCNC: 11 MG/DL (ref 8.6–10.4)
CHLORIDE SERPL-SCNC: 102 MMOL/L (ref 98–110)
CO2 SERPL-SCNC: 32 MMOL/L (ref 20–32)
CREAT SERPL-MCNC: 1.01 MG/DL (ref 0.6–0.95)
EGFRCR SERPLBLD CKD-EPI 2021: 54 ML/MIN/1.73M2
ERYTHROCYTE [DISTWIDTH] IN BLOOD BY AUTOMATED COUNT: 12.1 % (ref 11–15)
GLUCOSE SERPL-MCNC: 92 MG/DL (ref 65–99)
HCT VFR BLD AUTO: 43.3 % (ref 35–45)
HGB BLD-MCNC: 13.9 G/DL (ref 11.7–15.5)
MCH RBC QN AUTO: 31.4 PG (ref 27–33)
MCHC RBC AUTO-ENTMCNC: 32.1 G/DL (ref 32–36)
MCV RBC AUTO: 98 FL (ref 80–100)
METHYLMALONATE SERPL-SCNC: 200 NMOL/L (ref 85–423)
PLATELET # BLD AUTO: 202 THOUSAND/UL (ref 140–400)
PMV BLD REES-ECKER: 11 FL (ref 7.5–12.5)
POTASSIUM SERPL-SCNC: 5.4 MMOL/L (ref 3.5–5.3)
PROT SERPL-MCNC: 6.7 G/DL (ref 6.1–8.1)
RBC # BLD AUTO: 4.42 MILLION/UL (ref 3.8–5.1)
SODIUM SERPL-SCNC: 141 MMOL/L (ref 135–146)
TSH SERPL-ACNC: 0.69 MIU/L (ref 0.4–4.5)
VIT B12 SERPL-MCNC: >2000 PG/ML (ref 200–1100)
WBC # BLD AUTO: 9.4 THOUSAND/UL (ref 3.8–10.8)

## 2025-05-09 DIAGNOSIS — R39.15 URINARY URGENCY: ICD-10-CM

## 2025-05-09 DIAGNOSIS — F41.9 ANXIETY: ICD-10-CM

## 2025-05-09 RX ORDER — DULOXETIN HYDROCHLORIDE 30 MG/1
30 CAPSULE, DELAYED RELEASE ORAL 2 TIMES DAILY
Qty: 180 CAPSULE | Refills: 3 | Status: SHIPPED | OUTPATIENT
Start: 2025-05-09

## 2025-05-09 RX ORDER — MIRABEGRON 25 MG/1
25 TABLET, FILM COATED, EXTENDED RELEASE ORAL DAILY
Qty: 30 TABLET | Refills: 3 | Status: SHIPPED | OUTPATIENT
Start: 2025-05-09

## 2025-05-31 DIAGNOSIS — K52.832 LYMPHOCYTIC COLITIS: ICD-10-CM

## 2025-06-01 RX ORDER — BUDESONIDE 3 MG/1
9 CAPSULE, COATED PELLETS ORAL DAILY
Qty: 90 CAPSULE | Refills: 2 | Status: SHIPPED | OUTPATIENT
Start: 2025-06-01

## 2025-06-02 DIAGNOSIS — E87.5 HYPERKALEMIA: ICD-10-CM

## 2025-06-25 ENCOUNTER — TELEPHONE (OUTPATIENT)
Dept: CARDIOLOGY | Facility: HOSPITAL | Age: 89
End: 2025-06-25
Payer: MEDICARE

## 2025-06-25 DIAGNOSIS — G20.A1 PARKINSON'S DISEASE, UNSPECIFIED WHETHER DYSKINESIA PRESENT, UNSPECIFIED WHETHER MANIFESTATIONS FLUCTUATE: Primary | ICD-10-CM

## 2025-06-26 NOTE — TELEPHONE ENCOUNTER
Called and spoke to pharmacist and advised that per MD, ok to start medication given that QT on last EKG was fine. Advised can get an EKG 1 week after starting medication to check on QT.     Called pt to advise to come in for EKG ~1 week after starting new medication, no answer, LVM requesting call back to triage line. EKG order pended.

## 2025-06-30 NOTE — TELEPHONE ENCOUNTER
Attempted to call pt to advise to come in for EKG 1 week after starting Muplacid. No answer, LVM requesting call back for instruction.

## 2025-07-03 ENCOUNTER — TELEPHONE (OUTPATIENT)
Dept: CARDIOLOGY | Facility: CLINIC | Age: 89
End: 2025-07-03

## 2025-07-05 NOTE — PROGRESS NOTES
Subjective   Patient ID: Adelina Case is a 89 y.o. female.    HPI   Presents today in follow-up.  She is accompanied by her caregiver Gail Sahu did recently see her neurologist Dr. Klein who started Nuplazid for Parkinson's related hallucinations have been in touch with  and does need EKG today  She does think she is doing okay  She continues to have help during the day until early afternoon strongly considering afternoon to evening help as well  She has not had any further falls  Her daughter Barbara organizes medication and caregivers basically give it to her except for evening meds that she takes on her own  She does think she is eating okay  Overall her sleep patterns are improved as well still has occasional insomnia        Past medical history significant for  Parkinson's disease follows with neurology through [facility] Dr. Klein  She has been having more delusions and memory issues on Namenda  Frequent falls   Atrial fibrillation persistent she had Watchman procedure at high risk for anticoagulation because of frequent falls  Sleep apnea  COPD on nocturnal oxygen  Chronic peripheral edema  Heart failure  Hypertension  Diarrhea/collagenous colitis question of small intestinal bacterial overgrowth had been on Xifaxan in the past   Chronic insomnia using mirtazapine  Chronic kidney disease    Review of Systems    Objective   Physical Exam  Well-developed frail elderly no acute distress age-appropriate  HEENT exam notable for eyes are tearing otherwise unremarkable  Cardiovascular Irregular rate and rhythm systolic murmur is present  Periphery is with trace to 1+ edema she does not want support stockings  Gait is antalgic she walks with a walker  EKG was performed and shows atrial fibrillation interval not prolonged    Assessment/Plan   #1 Parkinson's disease seems to be doing okay overall she has had increased hallucinations and has had some hallucinations started on Nuplazid although  EKG is poor baseline QT interval looks okay I will make sure her cardiologist has copy of this as well I am continue her current medicine  She was unaware really of the hallucinations will check with daughter to see if have improved with medication  2.  Atrial fibrillation rate controlled she has Watchman as well seems to be doing pretty well  3.  History of heart failure with preserved LV function seems to be doing well at this time  #4 peripheral edema stable well-controlled with support stockings  5.  Diarrhea has had a few loose stools she follows routinely with Dr Sanchez if her symptoms worsen I have asked him to check in with him he often times makes adjustment to medications  6.  Parkinson's disease with hallucinations she has been pretty stable she follows with Dr. Klein through [facility] started Nuplazid for hallucinations I do not detect QT prolongation on EKG will contact cardiologist as well she does have upcoming appointment with cardiology in a couple weeks  35 minutes were spent with patient of which greater than 50% was spent in counseling and coordination of care  This note was partially generated using the Dragon voice recognition system.  There may be some incorrect wording ,grammar, spelling or punctuation errors that were not corrected prior to committing the note to the medical record.

## 2025-07-07 ENCOUNTER — APPOINTMENT (OUTPATIENT)
Dept: PRIMARY CARE | Facility: CLINIC | Age: 89
End: 2025-07-07
Payer: MEDICARE

## 2025-07-07 VITALS — SYSTOLIC BLOOD PRESSURE: 118 MMHG | DIASTOLIC BLOOD PRESSURE: 68 MMHG

## 2025-07-07 DIAGNOSIS — J43.8 OTHER EMPHYSEMA (MULTI): ICD-10-CM

## 2025-07-07 DIAGNOSIS — K52.832 LYMPHOCYTIC COLITIS: ICD-10-CM

## 2025-07-07 DIAGNOSIS — I48.19 ATRIAL FIBRILLATION, PERSISTENT (MULTI): Primary | ICD-10-CM

## 2025-07-07 DIAGNOSIS — I10 PRIMARY HYPERTENSION: ICD-10-CM

## 2025-07-07 DIAGNOSIS — G20.A1 PARKINSON'S DISEASE, UNSPECIFIED WHETHER DYSKINESIA PRESENT, UNSPECIFIED WHETHER MANIFESTATIONS FLUCTUATE: ICD-10-CM

## 2025-07-07 DIAGNOSIS — N18.30 STAGE 3 CHRONIC KIDNEY DISEASE, UNSPECIFIED WHETHER STAGE 3A OR 3B CKD (MULTI): ICD-10-CM

## 2025-07-07 PROCEDURE — 1036F TOBACCO NON-USER: CPT | Performed by: INTERNAL MEDICINE

## 2025-07-07 PROCEDURE — 93000 ELECTROCARDIOGRAM COMPLETE: CPT | Performed by: INTERNAL MEDICINE

## 2025-07-07 PROCEDURE — 99214 OFFICE O/P EST MOD 30 MIN: CPT | Performed by: INTERNAL MEDICINE

## 2025-07-07 PROCEDURE — 1160F RVW MEDS BY RX/DR IN RCRD: CPT | Performed by: INTERNAL MEDICINE

## 2025-07-07 PROCEDURE — 3074F SYST BP LT 130 MM HG: CPT | Performed by: INTERNAL MEDICINE

## 2025-07-07 PROCEDURE — 3078F DIAST BP <80 MM HG: CPT | Performed by: INTERNAL MEDICINE

## 2025-07-07 PROCEDURE — 1159F MED LIST DOCD IN RCRD: CPT | Performed by: INTERNAL MEDICINE

## 2025-07-07 NOTE — PATIENT INSTRUCTIONS
As always it was good to see you  I do think you are doing okay overall.  I do agree that adding afternoon health could be very beneficial for you and do recommend this  Continue to stay as active as possible  EKG was performed today and looks okay to me so therefore I do think he can continue on your  I will make sure Dr. Huddleston gets a copy of this EKG as well  Keep track of the diarrhea if things become worse I do want you to contact Dr. Daniel Hilton make a follow-up visit for 2 months(as we discussed make sure you bring your updated medication list with you at that time)

## 2025-07-21 ENCOUNTER — OFFICE VISIT (OUTPATIENT)
Dept: CARDIOLOGY | Facility: CLINIC | Age: 89
End: 2025-07-21
Payer: MEDICARE

## 2025-07-21 VITALS
WEIGHT: 133.4 LBS | HEART RATE: 57 BPM | SYSTOLIC BLOOD PRESSURE: 153 MMHG | DIASTOLIC BLOOD PRESSURE: 68 MMHG | OXYGEN SATURATION: 95 % | BODY MASS INDEX: 21.44 KG/M2 | HEIGHT: 66 IN

## 2025-07-21 DIAGNOSIS — I50.30 HEART FAILURE WITH PRESERVED LEFT VENTRICULAR FUNCTION (HFPEF): ICD-10-CM

## 2025-07-21 DIAGNOSIS — I10 HYPERTENSION, UNSPECIFIED TYPE: ICD-10-CM

## 2025-07-21 DIAGNOSIS — I25.10 CORONARY ARTERY CALCIFICATION SEEN ON CT SCAN: ICD-10-CM

## 2025-07-21 DIAGNOSIS — I48.19 ATRIAL FIBRILLATION, PERSISTENT (MULTI): Primary | ICD-10-CM

## 2025-07-21 DIAGNOSIS — E78.5 HYPERLIPIDEMIA, UNSPECIFIED HYPERLIPIDEMIA TYPE: ICD-10-CM

## 2025-07-21 DIAGNOSIS — Z95.818 PRESENCE OF WATCHMAN LEFT ATRIAL APPENDAGE CLOSURE DEVICE: ICD-10-CM

## 2025-07-21 DIAGNOSIS — I35.0 MILD AORTIC STENOSIS: ICD-10-CM

## 2025-07-21 PROCEDURE — 3078F DIAST BP <80 MM HG: CPT | Performed by: INTERNAL MEDICINE

## 2025-07-21 PROCEDURE — 3077F SYST BP >= 140 MM HG: CPT | Performed by: INTERNAL MEDICINE

## 2025-07-21 PROCEDURE — 1126F AMNT PAIN NOTED NONE PRSNT: CPT | Performed by: INTERNAL MEDICINE

## 2025-07-21 PROCEDURE — 99214 OFFICE O/P EST MOD 30 MIN: CPT | Performed by: INTERNAL MEDICINE

## 2025-07-21 PROCEDURE — 99212 OFFICE O/P EST SF 10 MIN: CPT

## 2025-07-21 ASSESSMENT — ENCOUNTER SYMPTOMS
HEADACHES: 0
MYALGIAS: 0
FALLS: 0
VOMITING: 0
BLOATING: 0
WHEEZING: 0
OCCASIONAL FEELINGS OF UNSTEADINESS: 1
CHILLS: 0
FEVER: 0
COUGH: 0
MEMORY LOSS: 0
HEMATURIA: 0
CONSTIPATION: 0
DIARRHEA: 0
ABDOMINAL PAIN: 0
ALTERED MENTAL STATUS: 0
DEPRESSION: 0
DYSURIA: 0
LOSS OF SENSATION IN FEET: 1
NAUSEA: 0
HEMOPTYSIS: 0

## 2025-07-21 ASSESSMENT — PAIN SCALES - GENERAL: PAINLEVEL_OUTOF10: 0-NO PAIN

## 2025-07-21 NOTE — PATIENT INSTRUCTIONS
Goal BP at least <140/90, optimal <130/80    If running high, let me know [phone] or [employer]

## 2025-07-21 NOTE — PROGRESS NOTES
Chief Complaint   Patient presents with    Follow-up    Atrial Fibrillation    Heart Failure      HPI  88 yo WF w/ h/o parox -> pers AFIB s/p DCCV 11/21 + 2/22, s/p Watchman 9/22, HFpEF, pulm HTN, mild AS, cor calc on CT, athero of Ao, HTN, HLD, GERD/PUD, COPD, MARYANN (intol CPAP), anemia, Parkinsons, ?TIA 9/21 now here for cardiology f/u.   No chest pain. No dyspnea at rest. +occ BUSTAMANTE (mod exertion), mildly improved on Lasix, ?worse in AFIB. No orthopnea/PND. No palps. +occ LH on standing up. No syncope. +ch LE edema, improved on Lasix. No claudication. No cough. +occ fatigue, ?worse in AFIB. +long h/o rare-occ soft fall (imbalance).  WGT at home: 130-135 lbs  WGT at appts: 6/21- 189, 7/21- 177, 12/, 3/22- 175lbs; 6/22- 170lbs; 1/23- 162lbs; 7/23- 152lb; 1/24- 138lbs; 1/25- 135lbs; 7/25- 133lbs  ECG 4/19: AFIB (106)  ECG 5/21: SR (73), PACs, QTc 445  ECG 7/21: SB (57), QTc 430  ECG 9/21: SR (68)  ECG 9/21: SB (55)  ECG 11/21: AFIB (92)  ECG 11/21: SR (60), PACs  ECG 2/22: AFIB/FL (89)  ECG 3/22: AFIB/FL (78)  ECG 4/22: AFIB (76)  ECG 4/22: SB (51), PAC  ECG 5/22: AFIB (94)  ECG 5/22: AFIB (61)  ECG 8/22: AFIB (62)  ECG 10/22: AFIB (65), nonsp ST changes  ECG 1/23: AFIB (57)  ECG 4/23: AFIB (58)  ECG 10/23: AFIB (54), nonsp ST-T changes  ECG 12/24: AFIB (75), RAD  ECG 7/25: AFIB (80), nonsp ST-T changes, QTc 419  ECG 7/25: AFIB (64), nonsp ST-T changes, QTc 379  HM 5/22: %, HR  (avg 80), HR 31 at 430am, 80 pauses (long 4.2s at 1250am)  Echo 9/18: EF 70%, DD, mils AS, mid-mod TR, PASP 53, small PE  Echo 10/19: EF 65-70%, DD, mod-sev LAE, mild AS, PASP 43  Echo 5/21: EF 70-75%, DD, nl RV, sev LAE, mild AS, mild-mod TR, PASP 73, nl IVC  Echo 4/22: TDS, EF 65-70%, mod-sev LAE, mod AS (26/12/1.2), mod TR, PASP 73 [AS likely at most mild-mod based on numbers]  CXR 5/21: sm B pl eff  CXR 9/21: CM, rae vasc cielo  CXR 4/22: no acute abnl  CXR 5/22: no acute abnl  CXR 1/24: sm-mod R pleur eff, ?int edema  CT  chest 8/12: mod cor calc, mod athero of Ao  CT Watchman 9/22: no thrombus, mod athero of Ao  CTA heart 11/22: mild vs mod cor calc, sev SANDY, small amount of contrast within device w/ ext to part thromb atrial appendage most likely c/w incomplete endothel of the device, no evidence of braulio-device leak or thrombus  PFT 7/20: mild obst (no resp), no rest, nl DLCO  CT ab 9/18: no AAA  CT/MRI brain 9/21: no acute abnl  CT brain 8/22: no acute abnl  CT brain 1/23: no acute abnl  MRA brain 9/21: no sig stenosis  MRA neck 9/21: LICA 25-30%, AMANDA 20%     Review of Systems   Constitutional: Negative for chills, fever and malaise/fatigue.   HENT:  Negative for hearing loss.    Eyes:  Negative for visual disturbance.   Respiratory:  Negative for cough, hemoptysis and wheezing.    Skin:  Negative for rash.   Musculoskeletal:  Negative for falls and myalgias.   Gastrointestinal:  Negative for bloating, abdominal pain, constipation, diarrhea, dysphagia, nausea and vomiting.   Genitourinary:  Negative for dysuria and hematuria.   Neurological:  Negative for headaches.   Psychiatric/Behavioral:  Negative for altered mental status, depression and memory loss.       Social History     Tobacco Use    Smoking status: Former     Types: Cigarettes    Smokeless tobacco: Never   Substance Use Topics    Alcohol use: Yes     Alcohol/week: 7.0 standard drinks of alcohol     Types: 7 Shots of liquor per week      Family History   Problem Relation Name Age of Onset    Bipolar disorder Mother      Depression Mother      Parkinsonism Mother      Other (Cardiac disorder) Father      Hypertension Father      Other (Glioblastoma) Daughter      Cancer Other Grandmother     Bipolar disorder Other Family       Allergies   Allergen Reactions    Morphine Itching    Hydrochlorothiazide Rash      Current Outpatient Medications   Medication Instructions    BABY ASPIRIN ORAL 81 mg, Daily    Bifidobacterium infantis (ALIGN ORAL) Take by mouth.    biotin 5 mg  "capsule Take by mouth. Take as directed    budesonide EC (ENTOCORT EC) 9 mg, oral, Daily    busPIRone (BUSPAR) 15 mg, oral, 2 times daily    carbidopa-levodopa (Sinemet CR)  mg ER tablet 1 tablet, Every evening    carbidopa-levodopa (Sinemet)  mg tablet Take 2 tablets in the morning; and 2 tablets at noon , 1.5 evening.    cholecalciferol (Vitamin D3) 50 mcg (2,000 unit) capsule Take as directed    cyanocobalamin (VITAMIN B-12) 1,000 mcg, Daily RT    dexAMETHasone (Decadron) 0.1 % ophthalmic solution INSTILL 1 DROP INTO BOTH EYES EVERY DAY AS DIRECTED    digoxin (Lanoxin) 125 MCG tablet TAKE 1 TABLET DAILY 5 DAYS OF THE WEEK.    DULoxetine (CYMBALTA) 30 mg, oral, 2 times daily    famotidine (Pepcid) 20 mg tablet 1 tablet, Daily    ferrous gluconate (Fergon) 240 (27 Fe) MG tablet 1 tablet, Daily    furosemide (LASIX) 60 mg, oral, 2 times daily    Incruse Ellipta 62.5 mcg, inhalation, Daily    losartan (COZAAR) 50 mg, oral, Daily    melatonin 5 mg capsule 1 tablet, Nightly    memantine (NAMENDA) 5 mg, 2 times daily    metoprolol succinate XL (TOPROL-XL) 12.5 mg, oral, Daily    metroNIDAZOLE (Metrogel) 0.75 % gel Apply daily for rosacea    mirtazapine (REMERON) 7.5 mg, oral, Nightly    Myrbetriq 25 mg, oral, Daily    oxygen (O2) gas therapy 2 Liters via NC with sleep    pantoprazole (PROTONIX) 40 mg, oral, Daily before breakfast    simvastatin (ZOCOR) 20 mg, oral, Daily    traZODone (DESYREL) 50 mg, oral, Nightly    turmeric root extract 500 mg tablet Take as directed            12/27/2024     7:50 PM 12/27/2024     9:04 PM 1/21/2025    10:33 AM 5/1/2025    11:32 AM 5/1/2025    12:06 PM 7/7/2025    11:55 AM 7/21/2025    11:36 AM   Vitals   Systolic 130 129 138 150 144 118 153   Diastolic 53 87 70 70 70 68 68   BP Location   Left arm    Right arm   Heart Rate 63 58 62 69   57   Temp 37.2 °C (99 °F)         Resp 18 16        Height 1.676 m (5' 6\")  1.651 m (5' 5\")    1.676 m (5' 6\")   Weight (lb) 140  135 " 134.5   133.4   BMI 22.6 kg/m2  22.47 kg/m2 22.38 kg/m2   21.53 kg/m2   BSA (m2) 1.72 m2  1.68 m2 1.67 m2   1.68 m2   Visit Report   Report Report Report Report Report        Physical Exam  Constitutional:       Appearance: Normal appearance.   HENT:      Head: Normocephalic and atraumatic.      Nose: Nose normal.   Neck:      Vascular: No carotid bruit.     Cardiovascular:      Rate and Rhythm: Normal rate. Rhythm irregular.      Heart sounds: No murmur heard.     Comments: Tr LE edema  Pulmonary:      Effort: Pulmonary effort is normal.      Breath sounds: Normal breath sounds.   Abdominal:      Palpations: Abdomen is soft.      Tenderness: There is no abdominal tenderness.     Musculoskeletal:      Right lower leg: Edema present.      Left lower leg: Edema present.     Skin:     General: Skin is warm and dry.     Neurological:      General: No focal deficit present.      Mental Status: She is alert.     Psychiatric:         Mood and Affect: Mood normal.         Behavior: Behavior normal.         Thought Content: Thought content normal.         Judgment: Judgment normal.        Results/Data  5/25 Cr 1.01, K 5.4, HGB 13.9, , TSH 0.69  12/24 Cr 1.07, K 4.5, LFT nl, LDL 83, HDL 50, , Chol 170, HGB 12.9, , hgba1c 5.2, TSH 1.39, CRP <0.10, HSTPN 35  6/24 Cr 0.83, K 4.3  3/24 Cr 0.87, K 4.2, TSH 0.93, Dig 1.21  10/23 Cr 0.91, K 3.8, Dig 1.93  9/23 HGB 11.7  4/23 Cr 0.97, K 4.8, ALT/AST nl, LDL 92, HDL 45, , Chol 176, HGB 12, , TSH 0.68, CRP <0.1, Dig 1.28  10/22 Cr 1.0, K 4.3  9/22 Cr 1.05, K 4.7, HGB 11,   8/22 Cr 1.06, K 4.4, LFT nl  5/22 Cr 1.01, K 3.8, Dig 1.55  4/22 Cr 0.82, K 3.8, Mg 2.4, LFT nl, HGB 10, , TPN neg,  -> 283  12/21 Cr 1.1, K 4.3, Mg 1.98  11/21 Cr 1.23, K 3.8, LFT nl, HGB 12.6, , TSH 0.69  9/21 LDL 76, HDL 64, TG 81, Chol 157, hgba1c 6.0, TPN neg,   6/21 Cr 1.04, K 5.1, Mg 2.5, LFT nl, HGB 9.9,   5/21 TPN 0.08,   9/17    12/18 LDL 86, HDL 62, , Chol 169     Assessment/Plan   89 yo WF w/ h/o parox -> pers AFIB s/p DCCV 11/21 + 2/22, s/p Watchman 9/22, HFpEF, pulm HTN, mild AS, cor calc on CT, athero of Ao, HTN, HLD, GERD/PUD, COPD, MARYANN (intol CPAP), anemia, Parkinsons, ?TIA 9/21. Doing well. Near compensated.  BP high today; was 118/68 earlier this month. Check occ at home and notify if high.  Pulm HTN likely related to CHF as well. Can re-assess once compensated. Will remain conservative in this elderly patient.  -continue ASA 81 qd (with food)  -continue Metoprolol Succinate 25 qd  -continue Dig 0.125 qd  -continue Losartan 50 every day (can increase if needed for HTN - if K allows)  -continue Furosemide 60 bid  -consider resume Geoffrey in future if needed and kidney allows (K too high now)  -continue Simva 20 qd  -low salt, high K/Mg diet  -f/u 6 months (earlier if needed)     Kuldip Huddleston MD

## 2025-07-29 ENCOUNTER — DOCUMENTATION (OUTPATIENT)
Dept: HOME HEALTH SERVICES | Facility: HOME HEALTH | Age: 89
End: 2025-07-29

## 2025-07-29 ENCOUNTER — APPOINTMENT (OUTPATIENT)
Dept: RADIOLOGY | Facility: HOSPITAL | Age: 89
End: 2025-07-29
Payer: MEDICARE

## 2025-07-29 ENCOUNTER — HOME HEALTH ADMISSION (OUTPATIENT)
Dept: HOME HEALTH SERVICES | Facility: HOME HEALTH | Age: 89
End: 2025-07-29
Payer: MEDICARE

## 2025-07-29 ENCOUNTER — HOSPITAL ENCOUNTER (OUTPATIENT)
Dept: RADIOLOGY | Facility: HOSPITAL | Age: 89
Discharge: HOME | End: 2025-07-29
Payer: MEDICARE

## 2025-07-29 ENCOUNTER — APPOINTMENT (OUTPATIENT)
Dept: ORTHOPEDIC SURGERY | Facility: HOSPITAL | Age: 89
End: 2025-07-29
Payer: MEDICARE

## 2025-07-29 DIAGNOSIS — M12.811 ROTATOR CUFF ARTHROPATHY OF RIGHT SHOULDER: ICD-10-CM

## 2025-07-29 DIAGNOSIS — M25.512 LEFT SHOULDER PAIN, UNSPECIFIED CHRONICITY: ICD-10-CM

## 2025-07-29 DIAGNOSIS — M12.812 ROTATOR CUFF ARTHROPATHY OF LEFT SHOULDER: Primary | ICD-10-CM

## 2025-07-29 PROCEDURE — 99214 OFFICE O/P EST MOD 30 MIN: CPT | Performed by: FAMILY MEDICINE

## 2025-07-29 PROCEDURE — 99213 OFFICE O/P EST LOW 20 MIN: CPT | Mod: 25 | Performed by: FAMILY MEDICINE

## 2025-07-29 PROCEDURE — 1159F MED LIST DOCD IN RCRD: CPT | Performed by: FAMILY MEDICINE

## 2025-07-29 PROCEDURE — 73030 X-RAY EXAM OF SHOULDER: CPT | Mod: LT

## 2025-07-29 PROCEDURE — 2500000004 HC RX 250 GENERAL PHARMACY W/ HCPCS (ALT 636 FOR OP/ED): Mod: JW | Performed by: FAMILY MEDICINE

## 2025-07-29 PROCEDURE — 99213 OFFICE O/P EST LOW 20 MIN: CPT | Mod: 25

## 2025-07-29 PROCEDURE — 20610 DRAIN/INJ JOINT/BURSA W/O US: CPT | Mod: LT | Performed by: FAMILY MEDICINE

## 2025-07-29 PROCEDURE — 73030 X-RAY EXAM OF SHOULDER: CPT | Mod: LEFT SIDE | Performed by: RADIOLOGY

## 2025-07-29 RX ORDER — METHYLPREDNISOLONE ACETATE 40 MG/ML
80 INJECTION, SUSPENSION INTRA-ARTICULAR; INTRALESIONAL; INTRAMUSCULAR; SOFT TISSUE
Status: COMPLETED | OUTPATIENT
Start: 2025-07-29 | End: 2025-07-29

## 2025-07-29 RX ORDER — ROPIVACAINE HYDROCHLORIDE 5 MG/ML
4 INJECTION, SOLUTION EPIDURAL; INFILTRATION; PERINEURAL
Status: COMPLETED | OUTPATIENT
Start: 2025-07-29 | End: 2025-07-29

## 2025-07-29 RX ORDER — LIDOCAINE HYDROCHLORIDE 10 MG/ML
4 INJECTION, SOLUTION INFILTRATION; PERINEURAL
Status: COMPLETED | OUTPATIENT
Start: 2025-07-29 | End: 2025-07-29

## 2025-07-29 RX ADMIN — ROPIVACAINE HYDROCHLORIDE 4 ML: 5 INJECTION EPIDURAL; INFILTRATION; PERINEURAL at 14:30

## 2025-07-29 RX ADMIN — LIDOCAINE HYDROCHLORIDE 4 ML: 10 INJECTION, SOLUTION INFILTRATION; PERINEURAL at 14:30

## 2025-07-29 RX ADMIN — METHYLPREDNISOLONE ACETATE 80 MG: 40 INJECTION, SUSPENSION INTRA-ARTICULAR; INTRALESIONAL; INTRAMUSCULAR; INTRASYNOVIAL; SOFT TISSUE at 14:30

## 2025-07-29 ASSESSMENT — PAIN SCALES - GENERAL: PAINLEVEL_OUTOF10: 5 - MODERATE PAIN

## 2025-07-29 ASSESSMENT — PAIN - FUNCTIONAL ASSESSMENT: PAIN_FUNCTIONAL_ASSESSMENT: 0-10

## 2025-07-29 NOTE — PROGRESS NOTES
Patient ID: Adelina Case is a 89 y.o. female.    L Inj/Asp: L subacromial bursa on 7/29/2025 2:30 PM  Indications: pain  Details: 21 G needle, posterior approach  Medications: 4 mL lidocaine 10 mg/mL (1 %); 80 mg methylPREDNISolone acetate 40 mg/mL; 4 mL ropivacaine 5 mg/mL (0.5 %)  Outcome: tolerated well, no immediate complications  Procedure, treatment alternatives, risks and benefits explained, specific risks discussed. Consent was given by the patient. Immediately prior to procedure a time out was called to verify the correct patient, procedure, equipment, support staff and site/side marked as required. Patient was prepped and draped in the usual sterile fashion.       Sports Medicine Office Note    Today's Date:  07/29/2025     HPI: Adelina Case is a 89 y.o. retired female with a history of Parkinson's disease and long-standing spinal stenosis who presents today with her adult daughter for chronic right shoulder pain.  She had previously been receiving injections by Dr. Salamanca.  I have seen her in the past for chronic left hip pain with cortisone injection.     On 3/10/20, she presented for possible cortisone injection into her left hip upon referral by Dr. Sampson. She has been having left hip and groin pain for several weeks without direct injury or trauma. This is interfering with her exercise program for Parkinson's disease. She describes her pain in the groin and buttocks area. She is having trouble with ADLs. She has had lumbar epidural steroids in the past for spinal stenosis. She has had no injections at her left hip before. She has no problems with the right hip. We agreed to a diagnostic and hopefully therapeutic cortisone injection into the left hip. The patient tolerated this well. Activity modifications were reviewed. Patient will keep any scheduled follow-up with Dr. Sampson.     On 4/7/2020, she follows up by telephone regarding her left hip 6 weeks status post cortisone injection. She  reports great relief for 2-3 days and then it wore off for a couple of days before giving additional improvement. Today, she reports she is 50â€“60 percent better than before the injection. She awakes with a small amount of pain but she is able to walk and work it out. She has been doing home exercises daily. She takes APAP 2-3 tablets per day. She definitely feels this is coming from her hip and not from her back. Overall she is happy with the improvement from the injection. She has an appointment with Dr. Sampson in May. We agreed that she responded well to the cortisone injection into the left hip. She understands this can be repeated every 3-4 months or later. Activity modifications were reviewed. She will keep any scheduled follow-up with Dr. Sampson. I am happy to see the patient as needed.     On 11/3/2020, she returns to the office with her adult daughter for follow-up of chronic left hip pain and is requesting repeat cortisone injection for analgesia. Her daughter is an orthopedic physicians assistant. Adelina reports the previous cortisone injection given on 4/7/2020 gave her great relief until 2 to 3 weeks ago when her pain gradually started to return. She got 6.5-7 months of great relief. She denies interval injury or trauma. Her pain is almost as bad as it was before the previous injection. She is requesting repeat injection for long-term analgesia. We agreed to repeat a cortisone injection into the left hip. She tolerated this well. They understand that this can be repeated every 3 to 4 months or later if needed. Activity modifications were reviewed.      On 10/7/2022, she returns for 2-year follow-up of chronic left hip pain and is requesting cortisone injection. She recently suffered a fall 1 month ago which exacerbated her pain. She did not come in due to the COVID pandemic. She has had no other treatments.  We agreed to repeat a cortisone injection into the left hip. She tolerated this well. They  understand that this can be repeated every 3 to 4 months or later if needed. Activity modifications were reviewed. She will keep any scheduled follow-up with Dr. Sampson.     2/21/2024, she returns to me for evaluation of a new complaint of chronic right shoulder pain.  She had previously been under the care of Dr. Roque Hayes and received serial cortisone injections in the subacromial space.  Most recent was 11/16/2023 and this gave her 2 months of great relief.  They thought it might be easier to get into see me and not have to wait so long.  They are happy with her previous injections into the left hip.  They deny interval injury or trauma.  She is requesting increased range of motion and decreased pain  We agreed to a subacromial cortisone injection at her right shoulder to help with chronic pain from severe rotator cuff arthropathy.  If this gives her no benefit we may want to switch to a glenohumeral joint injection.  She can always follow-up with Dr. Hayes for further surgical options if needed.  Activity modifications were reviewed.  I am happy to see her back when her pain returns.    Today, 07/29/2025, she returns for 17-month follow-up of shoulder pain.  Today she describes left more than right and denies recent injury or trauma.  Her daughter states she has had 2 to 3 months of left shoulder pain with limited range of motion.  She is also having pain at the right shoulder but not nearly as bad.  We have treated the right shoulder in the past.  They are interested in repeating cortisone injections to both shoulders today if possible.    She has no other complaints.    Physical Examination:     Both shoulders are without obvious signs of acute bony deformity, swelling, erythema or ecchymosis.  There is no tenderness.  Active range of motion is limited in all directions of both shoulders.  Passive range is not much better.  Impingement signs are positive.  Rotator cuff strength is significantly weak.   The neck is without obvious abnormality.    Imaging:  Radiographs of the LEFT shoulder obtained today were reviewed and revealed severe rotator cuff arthropathy with bone-on-bone humeral head acromial articulation.  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  There are no signs of acute fractures or dislocations.  These are not quite as bad as the right shoulder from x-rays from last year.  The studies were reviewed by me personally in the office today.      Procedure:    Procedure Note:  Procedure #1: After consent was obtained, the LEFT posterior shoulder was prepped in sterile fashion. The area was aspirated and Depo-Medrol 80 mg with lidocaine 4 mL & ropivacaine 4 mL were injected into the subacromial space without complications. The patient tolerated the procedure well and the area was cleaned and bandaged.    Procedure #2: After consent was obtained, the RIGHT posterior shoulder was prepped in sterile fashion. The area was aspirated and Depo-Medrol 80 mg with lidocaine 4 mL & ropivacaine 4 mL were injected into the subacromial space without complications. The patient tolerated the procedure well and the area was cleaned and bandaged.    Visit Diagnoses:  1. Rotator cuff arthropathy of left shoulder        2. Left shoulder pain, unspecified chronicity  XR shoulder left 2+ views    CANCELED: XR shoulder left 2+ views      3. Rotator cuff arthropathy of right shoulder            Assessment and Plan:     We reviewed the exam and x-ray findings and discussed the conservative and surgical treatment options. We agreed to repeat cortisone injection at her right shoulder and provide 1 for her left shoulder for chronic rotator cuff arthropathy pain.  She tolerated this well.  Activity modifications were reviewed.  This can be repeated in 3 to 4 months or later as needed.  Home PT was ordered to help with her ability to care for herself with ADLs.    **This note was dictated using Dragon speech recognition software and was not  corrected for spelling or grammatical errors**.    Salvador Purcell MD  Sports Medicine Specialist  [facility] [place]

## 2025-07-29 NOTE — HH CARE COORDINATION
Home Care received a Referral for Physical Therapy. We have processed the referral for a Start of Care within 48 hours of 7/31/25.     If you have any questions or concerns, please feel free to contact us at [phone]. Follow the prompts, enter your five digit zip code, and you will be directed to your care team on [facility].

## 2025-08-01 DIAGNOSIS — I10 PRIMARY HYPERTENSION: ICD-10-CM

## 2025-08-01 RX ORDER — LOSARTAN POTASSIUM 100 MG/1
100 TABLET ORAL DAILY
Qty: 90 TABLET | Refills: 0 | Status: SHIPPED | OUTPATIENT
Start: 2025-08-01 | End: 2026-08-01

## 2025-08-04 ENCOUNTER — HOME CARE VISIT (OUTPATIENT)
Dept: HOME HEALTH SERVICES | Facility: HOME HEALTH | Age: 89
End: 2025-08-04
Payer: MEDICARE

## 2025-08-04 VITALS
OXYGEN SATURATION: 96 % | DIASTOLIC BLOOD PRESSURE: 60 MMHG | TEMPERATURE: 97.1 F | HEART RATE: 74 BPM | SYSTOLIC BLOOD PRESSURE: 130 MMHG | RESPIRATION RATE: 16 BRPM

## 2025-08-04 PROCEDURE — G0151 HHCP-SERV OF PT,EA 15 MIN: HCPCS | Mod: HHH

## 2025-08-04 SDOH — HEALTH STABILITY: PHYSICAL HEALTH
EXERCISE COMMENTS: SITTING ANKLE PUMPS, LAQ, HIP FLEXION 5 REPS  B SHOULDER FLEXION WITH ARM ON TABLE AND SLIDING ARM FORWARD, CIRCLES ON TABLE, SHOULDER SHRUGS 5 REPS

## 2025-08-04 ASSESSMENT — ENCOUNTER SYMPTOMS
AGGRESSION WITHIN DEFINED LIMITS: 1
OCCASIONAL FEELINGS OF UNSTEADINESS: 0
LIMITED RANGE OF MOTION: 1
PAIN: 1
HIGHEST PAIN SEVERITY IN PAST 24 HOURS: 5/10
MUSCLE WEAKNESS: 1
ANGER WITHIN DEFINED LIMITS: 1
SLEEP QUALITY: FAIR
PERSON REPORTING PAIN: PATIENT
LOWEST PAIN SEVERITY IN PAST 24 HOURS: 0/10
PAIN SEVERITY GOAL: 0/10

## 2025-08-04 ASSESSMENT — ACTIVITIES OF DAILY LIVING (ADL)
AMBULATION ASSISTANCE ON FLAT SURFACES: 1
AMBULATION ASSISTANCE: STAND BY ASSIST
OASIS_M1830: 03
CURRENT_FUNCTION: STAND BY ASSIST
AMBULATION ASSISTANCE: 1
PHYSICAL TRANSFERS ASSESSED: 1
AMBULATION_DISTANCE/DURATION_TOLERATED: 40
ENTERING_EXITING_HOME: STAND BY ASSIST

## 2025-08-06 ENCOUNTER — HOME CARE VISIT (OUTPATIENT)
Dept: HOME HEALTH SERVICES | Facility: HOME HEALTH | Age: 89
End: 2025-08-06
Payer: MEDICARE

## 2025-08-06 VITALS
OXYGEN SATURATION: 97 % | RESPIRATION RATE: 16 BRPM | HEART RATE: 54 BPM | DIASTOLIC BLOOD PRESSURE: 70 MMHG | SYSTOLIC BLOOD PRESSURE: 140 MMHG | TEMPERATURE: 97.8 F

## 2025-08-06 PROCEDURE — G0151 HHCP-SERV OF PT,EA 15 MIN: HCPCS | Mod: HHH

## 2025-08-06 SDOH — HEALTH STABILITY: PHYSICAL HEALTH
EXERCISE COMMENTS: SUPINE QUAD SETS, GLUTEAL SETS 10 REPS, HEEL SLIDES 5 REPS THEN BACK STARTS HURTING, SAQ 5 REPS THEN NEEDED TO STOP,  SUPINE PROM FOR SHOULDER FLEXION, ABDUCTION, INTERNAL AND EXTERNAL ROTATION

## 2025-08-06 ASSESSMENT — ACTIVITIES OF DAILY LIVING (ADL)
AMBULATION ASSISTANCE: STAND BY ASSIST
AMBULATION ASSISTANCE ON FLAT SURFACES: 1
AMBULATION_DISTANCE/DURATION_TOLERATED: 40
AMBULATION ASSISTANCE: 1
PHYSICAL TRANSFERS ASSESSED: 1
CURRENT_FUNCTION: STAND BY ASSIST

## 2025-08-06 ASSESSMENT — ENCOUNTER SYMPTOMS
PERSON REPORTING PAIN: PATIENT
DENIES PAIN: 1
MUSCLE WEAKNESS: 1

## 2025-08-11 ENCOUNTER — HOME CARE VISIT (OUTPATIENT)
Dept: HOME HEALTH SERVICES | Facility: HOME HEALTH | Age: 89
End: 2025-08-11
Payer: MEDICARE

## 2025-08-12 LAB
ANION GAP SERPL CALCULATED.4IONS-SCNC: 11 MMOL/L (CALC) (ref 7–17)
BUN SERPL-MCNC: 52 MG/DL (ref 7–25)
BUN/CREAT SERPL: 48 (CALC) (ref 6–22)
CALCIUM SERPL-MCNC: 10.3 MG/DL (ref 8.6–10.4)
CHLORIDE SERPL-SCNC: 104 MMOL/L (ref 98–110)
CO2 SERPL-SCNC: 28 MMOL/L (ref 20–32)
CREAT SERPL-MCNC: 1.09 MG/DL (ref 0.6–0.95)
EGFRCR SERPLBLD CKD-EPI 2021: 49 ML/MIN/1.73M2
GLUCOSE SERPL-MCNC: 80 MG/DL (ref 65–99)
POTASSIUM SERPL-SCNC: 4.2 MMOL/L (ref 3.5–5.3)
SODIUM SERPL-SCNC: 143 MMOL/L (ref 135–146)

## 2025-08-13 ENCOUNTER — HOME CARE VISIT (OUTPATIENT)
Dept: HOME HEALTH SERVICES | Facility: HOME HEALTH | Age: 89
End: 2025-08-13
Payer: MEDICARE

## 2025-08-13 VITALS
HEART RATE: 76 BPM | RESPIRATION RATE: 16 BRPM | TEMPERATURE: 97.3 F | SYSTOLIC BLOOD PRESSURE: 138 MMHG | DIASTOLIC BLOOD PRESSURE: 70 MMHG | OXYGEN SATURATION: 97 %

## 2025-08-13 PROCEDURE — G0151 HHCP-SERV OF PT,EA 15 MIN: HCPCS | Mod: HHH

## 2025-08-13 SDOH — HEALTH STABILITY: PHYSICAL HEALTH
EXERCISE COMMENTS: SITTING ANKLE PUMPS, LAQ, HIP FLEXION, HIP ABDUCTION 10 REPS, SHOULDER SHRUGS, SHOULDER RETRACTION, PENDULUM FORWARD, BACK SIDEWAYS AND CIRCLES 10 REPS  STANDING STRETCHING FOR B SHOULDERS FLEXION, ROTATION, FORWARD, BACKWARDS, CIRCLES

## 2025-08-13 ASSESSMENT — ENCOUNTER SYMPTOMS
PERSON REPORTING PAIN: PATIENT
LOWEST PAIN SEVERITY IN PAST 24 HOURS: 1/10
HIGHEST PAIN SEVERITY IN PAST 24 HOURS: 8/10
PAIN: 1
PAIN SEVERITY GOAL: 1/10
SUBJECTIVE PAIN PROGRESSION: GRADUALLY IMPROVING

## 2025-08-13 ASSESSMENT — ACTIVITIES OF DAILY LIVING (ADL)
AMBULATION ASSISTANCE ON FLAT SURFACES: 1
AMBULATION_DISTANCE/DURATION_TOLERATED: 40

## 2025-08-15 ENCOUNTER — HOME CARE VISIT (OUTPATIENT)
Dept: HOME HEALTH SERVICES | Facility: HOME HEALTH | Age: 89
End: 2025-08-15
Payer: MEDICARE

## 2025-08-15 VITALS
RESPIRATION RATE: 16 BRPM | DIASTOLIC BLOOD PRESSURE: 68 MMHG | SYSTOLIC BLOOD PRESSURE: 120 MMHG | TEMPERATURE: 97.1 F | OXYGEN SATURATION: 97 % | HEART RATE: 52 BPM

## 2025-08-15 PROCEDURE — G0151 HHCP-SERV OF PT,EA 15 MIN: HCPCS | Mod: HHH

## 2025-08-15 SDOH — HEALTH STABILITY: PHYSICAL HEALTH
EXERCISE COMMENTS: WROTE OUT ARM EXERCISES FOR PATIENT SO PATIENT CAN HAVE A LIST AND UNDERSTAND WHAT TO DO: B UE SHOULDER FLEXION, CIRCLES ON TABLE, PENDULUM, SHOULDER SHRUGS, SHOULDER BLADE RETRACTION    SUPINE ANKLE PUMPS, QUAD SETS, HEEL SLIDES, GLUTEAL SETS, HIP A

## 2025-08-15 SDOH — HEALTH STABILITY: PHYSICAL HEALTH: EXERCISE COMMENTS: BDUCTION 5 REPS

## 2025-08-15 ASSESSMENT — ENCOUNTER SYMPTOMS
PERSON REPORTING PAIN: PATIENT
PAIN: 1
PAIN SEVERITY GOAL: 0/10
MUSCLE WEAKNESS: 1
LOWEST PAIN SEVERITY IN PAST 24 HOURS: 0/10
HIGHEST PAIN SEVERITY IN PAST 24 HOURS: 5/10

## 2025-08-15 ASSESSMENT — ACTIVITIES OF DAILY LIVING (ADL)
AMBULATION ASSISTANCE: 1
AMBULATION ASSISTANCE ON FLAT SURFACES: 1
PHYSICAL TRANSFERS ASSESSED: 1
AMBULATION_DISTANCE/DURATION_TOLERATED: 50
AMBULATION ASSISTANCE: SUPERVISION
CURRENT_FUNCTION: STAND BY ASSIST

## 2025-08-19 ENCOUNTER — HOME CARE VISIT (OUTPATIENT)
Dept: HOME HEALTH SERVICES | Facility: HOME HEALTH | Age: 89
End: 2025-08-19
Payer: MEDICARE

## 2025-08-19 VITALS
TEMPERATURE: 97.2 F | DIASTOLIC BLOOD PRESSURE: 60 MMHG | SYSTOLIC BLOOD PRESSURE: 140 MMHG | RESPIRATION RATE: 16 BRPM | HEART RATE: 75 BPM | OXYGEN SATURATION: 92 %

## 2025-08-19 PROCEDURE — G0151 HHCP-SERV OF PT,EA 15 MIN: HCPCS | Mod: HHH

## 2025-08-19 SDOH — HEALTH STABILITY: PHYSICAL HEALTH
EXERCISE COMMENTS: SITTING ACTIVE ASSIST SHOULDER FORWARD FLEXION, CIRCLES WITH ARM ON TABLE, SHOULDER SHRUGS, SCAPULA RETRACTION, BICEPS CURLS WITH AND SHOULDER ABDUCTION WITH STICK IN B HANDS  STANDING TOE RAISES, HIP FLEXION, KNEE FLEXION, HIP ABDUCTION 10 REPS

## 2025-08-19 ASSESSMENT — ACTIVITIES OF DAILY LIVING (ADL)
AMBULATION ASSISTANCE: 1
CURRENT_FUNCTION: SUPERVISION
AMBULATION_DISTANCE/DURATION_TOLERATED: 40
AMBULATION ASSISTANCE ON FLAT SURFACES: 1
AMBULATION ASSISTANCE: STAND BY ASSIST
PHYSICAL TRANSFERS ASSESSED: 1

## 2025-08-19 ASSESSMENT — ENCOUNTER SYMPTOMS
PAIN SEVERITY GOAL: 0/10
PERSON REPORTING PAIN: PATIENT
MUSCLE WEAKNESS: 1
LOWEST PAIN SEVERITY IN PAST 24 HOURS: 0/10
PAIN: 1

## 2025-08-21 ENCOUNTER — HOME CARE VISIT (OUTPATIENT)
Dept: HOME HEALTH SERVICES | Facility: HOME HEALTH | Age: 89
End: 2025-08-21
Payer: MEDICARE

## 2025-08-21 VITALS
DIASTOLIC BLOOD PRESSURE: 70 MMHG | OXYGEN SATURATION: 96 % | TEMPERATURE: 97.1 F | HEART RATE: 75 BPM | RESPIRATION RATE: 16 BRPM | SYSTOLIC BLOOD PRESSURE: 124 MMHG

## 2025-08-21 PROCEDURE — G0151 HHCP-SERV OF PT,EA 15 MIN: HCPCS | Mod: HHH

## 2025-08-21 SDOH — HEALTH STABILITY: PHYSICAL HEALTH
EXERCISE COMMENTS: SITTING ANKLE PUMPS, LAQ, HIP FLEXION, HIP ABDUCTION 10 REPS, B SHOULDER ACTIVE ASSIST WITH ARM ON TABLE FOR SHOULDER FLEXION, CIRCLES, SHOULDER SHRUGS, SHOULDER RETRACTION, BICEP CURLS, SHOULDER ABDUCTION WITH STICK IN HAND 5 TO 10 REPS. UPDATED HEP

## 2025-08-21 ASSESSMENT — ENCOUNTER SYMPTOMS
HIGHEST PAIN SEVERITY IN PAST 24 HOURS: 4/10
LOWEST PAIN SEVERITY IN PAST 24 HOURS: 2/10
PERSON REPORTING PAIN: PATIENT
PAIN: 1

## 2025-08-21 ASSESSMENT — ACTIVITIES OF DAILY LIVING (ADL)
AMBULATION_DISTANCE/DURATION_TOLERATED: 50
AMBULATION ASSISTANCE ON FLAT SURFACES: 1

## 2025-08-24 DIAGNOSIS — K52.832 LYMPHOCYTIC COLITIS: ICD-10-CM

## 2025-08-24 RX ORDER — BUDESONIDE 3 MG/1
9 CAPSULE, COATED PELLETS ORAL DAILY
Qty: 90 CAPSULE | Refills: 2 | Status: SHIPPED | OUTPATIENT
Start: 2025-08-24

## 2025-08-26 ENCOUNTER — HOME CARE VISIT (OUTPATIENT)
Dept: HOME HEALTH SERVICES | Facility: HOME HEALTH | Age: 89
End: 2025-08-26
Payer: MEDICARE

## 2025-08-26 VITALS — DIASTOLIC BLOOD PRESSURE: 62 MMHG | SYSTOLIC BLOOD PRESSURE: 110 MMHG | HEART RATE: 64 BPM | TEMPERATURE: 97.2 F

## 2025-08-26 PROCEDURE — G0152 HHCP-SERV OF OT,EA 15 MIN: HCPCS | Mod: HHH

## 2025-08-26 PROCEDURE — G0151 HHCP-SERV OF PT,EA 15 MIN: HCPCS | Mod: HHH

## 2025-08-26 SDOH — HEALTH STABILITY: PHYSICAL HEALTH: EXERCISE COMMENTS: PATIENT PERFORMED SITTING AND STANDING EXERCISES FOLLOWING WRITTEN HEP.

## 2025-08-26 SDOH — HEALTH STABILITY: MENTAL HEALTH: SMOKING IN HOME: 0

## 2025-08-26 SDOH — ECONOMIC STABILITY: HOUSING INSECURITY: EVIDENCE OF SMOKING MATERIAL: 0

## 2025-08-26 ASSESSMENT — ENCOUNTER SYMPTOMS
PAIN: 1
PERSON REPORTING PAIN: PATIENT
MUSCLE WEAKNESS: 1
LIMITED RANGE OF MOTION: 1

## 2025-08-26 ASSESSMENT — ACTIVITIES OF DAILY LIVING (ADL)
AMBULATION ASSISTANCE ON FLAT SURFACES: 1
AMBULATION ASSISTANCE: SUPERVISION
AMBULATION ASSISTANCE: 1
AMBULATION_DISTANCE/DURATION_TOLERATED: 50

## 2025-08-27 ASSESSMENT — ACTIVITIES OF DAILY LIVING (ADL)
DRESSING_LB_CURRENT_FUNCTION: MINIMUM ASSIST
TOILETING: 1
GROOMING ASSESSED: 1
BATHING_CURRENT_FUNCTION: STAND BY ASSIST
GROOMING EQUIPMENT USED: SET UP
TOILETING: SUPERVISION
BATHING_CURRENT_FUNCTION: SUPERVISION
DRESSING_UB_CURRENT_FUNCTION: STAND BY ASSIST
BATHING ASSESSED: 1

## 2025-08-28 DIAGNOSIS — I10 PRIMARY HYPERTENSION: ICD-10-CM

## 2025-08-29 ENCOUNTER — TELEPHONE (OUTPATIENT)
Dept: PRIMARY CARE | Facility: CLINIC | Age: 89
End: 2025-08-29
Payer: MEDICARE

## 2025-08-29 ENCOUNTER — HOME CARE VISIT (OUTPATIENT)
Dept: HOME HEALTH SERVICES | Facility: HOME HEALTH | Age: 89
End: 2025-08-29
Payer: MEDICARE

## 2025-08-29 VITALS
SYSTOLIC BLOOD PRESSURE: 148 MMHG | OXYGEN SATURATION: 91 % | HEART RATE: 61 BPM | DIASTOLIC BLOOD PRESSURE: 61 MMHG | TEMPERATURE: 96.3 F

## 2025-08-29 DIAGNOSIS — N39.0 URINARY TRACT INFECTION WITHOUT HEMATURIA, SITE UNSPECIFIED: ICD-10-CM

## 2025-08-29 PROCEDURE — G0151 HHCP-SERV OF PT,EA 15 MIN: HCPCS | Mod: HHH

## 2025-08-29 RX ORDER — METOPROLOL SUCCINATE 25 MG/1
12.5 TABLET, EXTENDED RELEASE ORAL DAILY
Qty: 45 TABLET | Refills: 3 | Status: SHIPPED | OUTPATIENT
Start: 2025-08-29

## 2025-08-29 SDOH — HEALTH STABILITY: PHYSICAL HEALTH: EXERCISE COMMENTS: NO EXERCISES TODAY DUE TO FALL EARLIER TODAY AND NEEDED TO GO GIVE A URINE SAMPLE AT THE LAB

## 2025-08-29 ASSESSMENT — ACTIVITIES OF DAILY LIVING (ADL)
AMBULATION ASSISTANCE ON FLAT SURFACES: 1
AMBULATION_DISTANCE/DURATION_TOLERATED: 40
AMBULATION ASSISTANCE: STAND BY ASSIST
CURRENT_FUNCTION: STAND BY ASSIST
PHYSICAL TRANSFERS ASSESSED: 1
AMBULATION ASSISTANCE: 1

## 2025-08-29 ASSESSMENT — ENCOUNTER SYMPTOMS
PERSON REPORTING PAIN: PATIENT
LOWEST PAIN SEVERITY IN PAST 24 HOURS: 2/10
MUSCLE WEAKNESS: 1
PAIN: 1
OCCASIONAL FEELINGS OF UNSTEADINESS: 1
PAIN SEVERITY GOAL: 2/10
HIGHEST PAIN SEVERITY IN PAST 24 HOURS: 4/10

## 2025-08-31 LAB
APPEARANCE UR: CLEAR
BACTERIA #/AREA URNS HPF: ABNORMAL /HPF
BACTERIA UR CULT: NORMAL
BILIRUB UR QL STRIP: NEGATIVE
COLOR UR: YELLOW
GLUCOSE UR QL STRIP: NEGATIVE
HGB UR QL STRIP: NEGATIVE
HYALINE CASTS #/AREA URNS LPF: ABNORMAL /LPF
KETONES UR QL STRIP: NEGATIVE
LEUKOCYTE ESTERASE UR QL STRIP: ABNORMAL
NITRITE UR QL STRIP: NEGATIVE
PH UR STRIP: ABNORMAL [PH] (ref 5–8)
PROT UR QL STRIP: ABNORMAL
RBC #/AREA URNS HPF: ABNORMAL /HPF
SERVICE CMNT-IMP: ABNORMAL
SP GR UR STRIP: 1.01 (ref 1–1.03)
SQUAMOUS #/AREA URNS HPF: ABNORMAL /HPF
WBC #/AREA URNS HPF: ABNORMAL /HPF

## 2025-09-03 ENCOUNTER — HOSPITAL ENCOUNTER (OUTPATIENT)
Dept: RADIOLOGY | Facility: HOSPITAL | Age: 89
Discharge: HOME | End: 2025-09-03
Payer: MEDICARE

## 2025-09-03 ENCOUNTER — HOME CARE VISIT (OUTPATIENT)
Dept: HOME HEALTH SERVICES | Facility: HOME HEALTH | Age: 89
End: 2025-09-03
Payer: MEDICARE

## 2025-09-03 ENCOUNTER — OFFICE VISIT (OUTPATIENT)
Dept: PRIMARY CARE | Facility: CLINIC | Age: 89
End: 2025-09-03
Payer: MEDICARE

## 2025-09-03 VITALS — OXYGEN SATURATION: 90 % | HEART RATE: 70 BPM | SYSTOLIC BLOOD PRESSURE: 122 MMHG | DIASTOLIC BLOOD PRESSURE: 60 MMHG

## 2025-09-03 DIAGNOSIS — F32.0 CURRENT MILD EPISODE OF MAJOR DEPRESSIVE DISORDER WITHOUT PRIOR EPISODE: ICD-10-CM

## 2025-09-03 DIAGNOSIS — S20.211A CONTUSION OF RIB ON RIGHT SIDE, INITIAL ENCOUNTER: ICD-10-CM

## 2025-09-03 DIAGNOSIS — R06.00 DYSPNEA, UNSPECIFIED TYPE: ICD-10-CM

## 2025-09-03 DIAGNOSIS — I48.19 ATRIAL FIBRILLATION, PERSISTENT (MULTI): Primary | ICD-10-CM

## 2025-09-03 DIAGNOSIS — F41.9 ANXIETY: ICD-10-CM

## 2025-09-03 DIAGNOSIS — I10 PRIMARY HYPERTENSION: ICD-10-CM

## 2025-09-03 PROCEDURE — 71101 X-RAY EXAM UNILAT RIBS/CHEST: CPT | Mod: RT

## 2025-09-03 PROCEDURE — 3078F DIAST BP <80 MM HG: CPT | Performed by: INTERNAL MEDICINE

## 2025-09-03 PROCEDURE — 1159F MED LIST DOCD IN RCRD: CPT | Performed by: INTERNAL MEDICINE

## 2025-09-03 PROCEDURE — 99214 OFFICE O/P EST MOD 30 MIN: CPT | Performed by: INTERNAL MEDICINE

## 2025-09-03 PROCEDURE — 3074F SYST BP LT 130 MM HG: CPT | Performed by: INTERNAL MEDICINE

## 2025-09-03 PROCEDURE — 1160F RVW MEDS BY RX/DR IN RCRD: CPT | Performed by: INTERNAL MEDICINE

## 2025-09-03 PROCEDURE — 1036F TOBACCO NON-USER: CPT | Performed by: INTERNAL MEDICINE

## 2025-09-03 PROCEDURE — 71101 X-RAY EXAM UNILAT RIBS/CHEST: CPT | Mod: RIGHT SIDE | Performed by: RADIOLOGY

## 2025-09-04 ENCOUNTER — HOME CARE VISIT (OUTPATIENT)
Dept: HOME HEALTH SERVICES | Facility: HOME HEALTH | Age: 89
End: 2025-09-04
Payer: MEDICARE

## 2025-09-04 ENCOUNTER — TELEPHONE (OUTPATIENT)
Dept: PRIMARY CARE | Facility: CLINIC | Age: 89
End: 2025-09-04
Payer: MEDICARE

## 2025-09-04 VITALS — SYSTOLIC BLOOD PRESSURE: 112 MMHG | HEART RATE: 72 BPM | TEMPERATURE: 97.3 F | DIASTOLIC BLOOD PRESSURE: 60 MMHG

## 2025-09-04 DIAGNOSIS — I50.30 HEART FAILURE WITH PRESERVED LEFT VENTRICULAR FUNCTION (HFPEF): ICD-10-CM

## 2025-09-04 DIAGNOSIS — I48.19 ATRIAL FIBRILLATION, PERSISTENT (MULTI): ICD-10-CM

## 2025-09-04 DIAGNOSIS — J43.8 OTHER EMPHYSEMA (MULTI): ICD-10-CM

## 2025-09-04 DIAGNOSIS — N18.30 STAGE 3 CHRONIC KIDNEY DISEASE, UNSPECIFIED WHETHER STAGE 3A OR 3B CKD (MULTI): ICD-10-CM

## 2025-09-04 DIAGNOSIS — G20.A1 PARKINSON'S DISEASE, UNSPECIFIED WHETHER DYSKINESIA PRESENT, UNSPECIFIED WHETHER MANIFESTATIONS FLUCTUATE: Primary | ICD-10-CM

## 2025-09-04 LAB
ALBUMIN SERPL-MCNC: 3.7 G/DL (ref 3.6–5.1)
ALP SERPL-CCNC: 84 U/L (ref 37–153)
ALT SERPL-CCNC: 9 U/L (ref 6–29)
ANION GAP SERPL CALCULATED.4IONS-SCNC: 8 MMOL/L (CALC) (ref 7–17)
AST SERPL-CCNC: 10 U/L (ref 10–35)
BASOPHILS # BLD AUTO: 32 CELLS/UL (ref 0–200)
BASOPHILS NFR BLD AUTO: 0.5 %
BILIRUB SERPL-MCNC: 0.8 MG/DL (ref 0.2–1.2)
BUN SERPL-MCNC: 43 MG/DL (ref 7–25)
CALCIUM SERPL-MCNC: 10.5 MG/DL (ref 8.6–10.4)
CHLORIDE SERPL-SCNC: 104 MMOL/L (ref 98–110)
CO2 SERPL-SCNC: 32 MMOL/L (ref 20–32)
CREAT SERPL-MCNC: 0.98 MG/DL (ref 0.6–0.95)
EGFRCR SERPLBLD CKD-EPI 2021: 56 ML/MIN/1.73M2
EOSINOPHIL # BLD AUTO: 58 CELLS/UL (ref 15–500)
EOSINOPHIL NFR BLD AUTO: 0.9 %
ERYTHROCYTE [DISTWIDTH] IN BLOOD BY AUTOMATED COUNT: 12.2 % (ref 11–15)
GLUCOSE SERPL-MCNC: 136 MG/DL (ref 65–139)
HCT VFR BLD AUTO: 40.9 % (ref 35–45)
HGB BLD-MCNC: 13.3 G/DL (ref 11.7–15.5)
LYMPHOCYTES # BLD AUTO: 698 CELLS/UL (ref 850–3900)
LYMPHOCYTES NFR BLD AUTO: 10.9 %
MCH RBC QN AUTO: 32.8 PG (ref 27–33)
MCHC RBC AUTO-ENTMCNC: 32.5 G/DL (ref 32–36)
MCV RBC AUTO: 101 FL (ref 80–100)
MONOCYTES # BLD AUTO: 570 CELLS/UL (ref 200–950)
MONOCYTES NFR BLD AUTO: 8.9 %
NEUTROPHILS # BLD AUTO: 5043 CELLS/UL (ref 1500–7800)
NEUTROPHILS NFR BLD AUTO: 78.8 %
PLATELET # BLD AUTO: 266 THOUSAND/UL (ref 140–400)
PMV BLD REES-ECKER: 10.2 FL (ref 7.5–12.5)
POTASSIUM SERPL-SCNC: 4.6 MMOL/L (ref 3.5–5.3)
PROT SERPL-MCNC: 6.1 G/DL (ref 6.1–8.1)
RBC # BLD AUTO: 4.05 MILLION/UL (ref 3.8–5.1)
SODIUM SERPL-SCNC: 144 MMOL/L (ref 135–146)
TSH SERPL-ACNC: 0.8 MIU/L (ref 0.4–4.5)
WBC # BLD AUTO: 6.4 THOUSAND/UL (ref 3.8–10.8)

## 2025-09-04 PROCEDURE — G0151 HHCP-SERV OF PT,EA 15 MIN: HCPCS | Mod: HHH

## 2025-09-04 SDOH — HEALTH STABILITY: PHYSICAL HEALTH: EXERCISE COMMENTS: NO EXERCISES TODAY DUE TO SOME ISSUES WITH PO LEVELS

## 2025-09-04 SDOH — HEALTH STABILITY: MENTAL HEALTH: SMOKING IN HOME: 0

## 2025-09-04 SDOH — ECONOMIC STABILITY: HOUSING INSECURITY: EVIDENCE OF SMOKING MATERIAL: 0

## 2025-09-04 ASSESSMENT — ENCOUNTER SYMPTOMS
PAIN: 1
PERSON REPORTING PAIN: PATIENT
MUSCLE WEAKNESS: 1

## 2025-09-04 ASSESSMENT — ACTIVITIES OF DAILY LIVING (ADL)
CURRENT_FUNCTION: STAND BY ASSIST
AMBULATION ASSISTANCE: 1
AMBULATION_DISTANCE/DURATION_TOLERATED: 40
AMBULATION ASSISTANCE: STAND BY ASSIST
PHYSICAL TRANSFERS ASSESSED: 1
AMBULATION ASSISTANCE ON FLAT SURFACES: 1

## 2025-09-08 ENCOUNTER — APPOINTMENT (OUTPATIENT)
Dept: PRIMARY CARE | Facility: CLINIC | Age: 89
End: 2025-09-08
Payer: MEDICARE

## 2025-09-09 ENCOUNTER — APPOINTMENT (OUTPATIENT)
Dept: PRIMARY CARE | Facility: CLINIC | Age: 89
End: 2025-09-09
Payer: MEDICARE